# Patient Record
Sex: FEMALE | Race: WHITE | NOT HISPANIC OR LATINO | Employment: OTHER | RURAL
[De-identification: names, ages, dates, MRNs, and addresses within clinical notes are randomized per-mention and may not be internally consistent; named-entity substitution may affect disease eponyms.]

---

## 2018-09-04 ENCOUNTER — OFFICE VISIT (OUTPATIENT)
Dept: RETAIL CLINIC | Facility: CLINIC | Age: 43
End: 2018-09-04

## 2018-09-04 VITALS
DIASTOLIC BLOOD PRESSURE: 89 MMHG | WEIGHT: 185.6 LBS | TEMPERATURE: 97.5 F | OXYGEN SATURATION: 97 % | RESPIRATION RATE: 15 BRPM | SYSTOLIC BLOOD PRESSURE: 124 MMHG | HEIGHT: 66 IN | HEART RATE: 75 BPM | BODY MASS INDEX: 29.83 KG/M2

## 2018-09-04 DIAGNOSIS — J02.9 SORE THROAT: Primary | ICD-10-CM

## 2018-09-04 LAB
EXPIRATION DATE: NORMAL
INTERNAL CONTROL: NORMAL
Lab: NORMAL
S PYO AG THROAT QL: NEGATIVE

## 2018-09-04 PROCEDURE — 99203 OFFICE O/P NEW LOW 30 MIN: CPT | Performed by: NURSE PRACTITIONER

## 2018-09-04 PROCEDURE — 87880 STREP A ASSAY W/OPTIC: CPT | Performed by: NURSE PRACTITIONER

## 2018-09-04 RX ORDER — PREDNISONE 20 MG/1
20 TABLET ORAL DAILY
Qty: 3 TABLET | Refills: 0 | Status: SHIPPED | OUTPATIENT
Start: 2018-09-04 | End: 2018-09-07

## 2018-09-04 NOTE — PROGRESS NOTES
"Subjective   Kiara Ballard is a 42 y.o. female.   /89   Pulse 75   Temp 97.5 °F (36.4 °C) (Temporal Artery )   Resp 15   Ht 167.6 cm (66\")   Wt 84.2 kg (185 lb 9.6 oz)   SpO2 97%   BMI 29.96 kg/m²       Sore Throat    This is a new problem. Episode onset: 5 days. The problem has been waxing and waning. The pain is worse on the right side. There has been no fever. The pain is severe (but improving). Associated symptoms include congestion, ear pain (right sided), headaches, a plugged ear sensation and neck pain (radiating on right side). Pertinent negatives include no abdominal pain, coughing, diarrhea, drooling, ear discharge, shortness of breath, stridor, swollen glands, trouble swallowing or vomiting.        The following portions of the patient's history were reviewed and updated as appropriate: allergies, current medications, past family history, past medical history, past social history, past surgical history and problem list.    Review of Systems   HENT: Positive for congestion, ear pain (right sided) and sore throat. Negative for drooling, ear discharge and trouble swallowing.    Respiratory: Negative for cough, shortness of breath and stridor.    Gastrointestinal: Positive for nausea. Negative for abdominal pain, diarrhea and vomiting.   Musculoskeletal: Positive for neck pain (radiating on right side).   Neurological: Positive for headaches.       Objective   Physical Exam   Constitutional: She appears well-developed and well-nourished.   HENT:   Head: Normocephalic and atraumatic.   Right Ear: Ear canal normal. Tympanic membrane is bulging (mild). Tympanic membrane is not retracted.   Left Ear: Tympanic membrane and ear canal normal. Tympanic membrane is not retracted and not bulging.   Nose: Mucosal edema and rhinorrhea present. Right sinus exhibits no maxillary sinus tenderness and no frontal sinus tenderness. Left sinus exhibits no maxillary sinus tenderness and no frontal sinus " tenderness.   Mouth/Throat: Uvula is midline. Posterior oropharyngeal erythema (mild) present. Tonsils are 0 on the right. Tonsils are 0 on the left.   Cardiovascular: Regular rhythm and normal heart sounds.    Pulmonary/Chest: Effort normal. She has no wheezes. She has no rhonchi. She has no rales.   Lymphadenopathy:     She has no cervical adenopathy.   Skin: Skin is warm and dry.       Assessment/Plan   Kiara was seen today for earache and headache.    Diagnoses and all orders for this visit:    Sore throat  -     POC Rapid Strep A    Other orders  -     neomycin-polymyxin-hydrocortisone (CORTISPORIN) 3.5-79425-0 otic solution; Administer 3 drops to the right ear 4 (Four) Times a Day for 7 days.  -     predniSONE (DELTASONE) 20 MG tablet; Take 1 tablet by mouth Daily for 3 days.          Results for orders placed or performed in visit on 09/04/18   POC Rapid Strep A   Result Value Ref Range    Rapid Strep A Screen Negative Negative, VALID, INVALID, Not Performed    Internal Control Passed Passed    Lot Number [id number]     Expiration Date 12,744,415

## 2019-05-21 ENCOUNTER — OFFICE VISIT (OUTPATIENT)
Dept: RETAIL CLINIC | Facility: CLINIC | Age: 44
End: 2019-05-21

## 2019-05-21 VITALS
BODY MASS INDEX: 31.24 KG/M2 | HEART RATE: 85 BPM | RESPIRATION RATE: 12 BRPM | OXYGEN SATURATION: 99 % | HEIGHT: 66 IN | TEMPERATURE: 98.2 F | WEIGHT: 194.4 LBS

## 2019-05-21 DIAGNOSIS — L50.9 HIVES: Primary | ICD-10-CM

## 2019-05-21 PROCEDURE — 99213 OFFICE O/P EST LOW 20 MIN: CPT | Performed by: NURSE PRACTITIONER

## 2019-05-21 RX ORDER — PREDNISONE 10 MG/1
TABLET ORAL
Qty: 21 TABLET | Refills: 0 | Status: SHIPPED | OUTPATIENT
Start: 2019-05-21 | End: 2019-08-28

## 2019-05-21 RX ORDER — LEVOTHYROXINE SODIUM 0.05 MG/1
TABLET ORAL
COMMUNITY
Start: 2019-05-13

## 2019-05-21 RX ORDER — VENLAFAXINE HYDROCHLORIDE 75 MG/1
CAPSULE, EXTENDED RELEASE ORAL
COMMUNITY
Start: 2019-05-13

## 2019-05-21 RX ORDER — MEDROXYPROGESTERONE ACETATE 10 MG/1
TABLET ORAL
Refills: 3 | COMMUNITY
Start: 2019-04-08 | End: 2019-08-28

## 2019-05-21 NOTE — PATIENT INSTRUCTIONS
Hives  Hives (urticaria) are itchy, red, swollen areas on your skin. Hives can show up on any part of your body, and they can vary in size. They can be as small as the tip of a pen or much larger. Hives often fade within 24 hours (acute hives). In other cases, new hives show up after old ones fade. This can continue for many days or weeks (chronic hives).  Hives are caused by your body's reaction to an irritant or to something that you are allergic to (trigger). You can get hives right after being around a trigger or hours later. Hives do not spread from person to person (are not contagious). Hives may get worse if you scratch them, if you exercise, or if you have worries (emotional stress).  Follow these instructions at home:  Medicines  · Take or apply over-the-counter and prescription medicines only as told by your doctor.  · If you were prescribed an antibiotic medicine, use it as told by your doctor. Do not stop taking the antibiotic even if you start to feel better.  Skin Care  · Apply cool, wet cloths (cool compresses) to the itchy, red, swollen areas.  · Do not scratch your skin. Do not rub your skin.  General instructions  · Do not take hot showers or baths. This can make itching worse.  · Do not wear tight clothes.  · Use sunscreen and wear clothing that covers your skin when you are outside.  · Avoid any triggers that cause your hives. Keep a journal to help you keep track of what causes your hives. Write down:  ? What medicines you take.  ? What you eat and drink.  ? What products you use on your skin.  · Keep all follow-up visits as told by your doctor. This is important.  Contact a doctor if:  · Your symptoms are not better with medicine.  · Your joints are painful or swollen.  Get help right away if:  · You have a fever.  · You have belly pain.  · Your tongue or lips are swollen.  · Your eyelids are swollen.  · Your chest or throat feels tight.  · You have trouble breathing or swallowing.  These  symptoms may be an emergency. Do not wait to see if the symptoms will go away. Get medical help right away. Call your local emergency services (911 in the [place] Do not drive yourself to the hospital.  This information is not intended to replace advice given to you by your health care provider. Make sure you discuss any questions you have with your health care provider.  Document Released: 09/26/2009 Document Revised: 05/25/2017 Document Reviewed: 10/05/2016  Elsevier Interactive Patient Education © 2019 [employer]

## 2019-05-21 NOTE — PROGRESS NOTES
"Subjective   Kiara Ballard is a 43 y.o. female.   Pulse 85   Temp 98.2 °F (36.8 °C)   Resp 12   Ht 167.6 cm (66\")   Wt 88.2 kg (194 lb 6.4 oz)   LMP 05/13/2019   SpO2 99%   BMI 31.38 kg/m²   Past Medical History:   Diagnosis Date   • Acid reflux    • Allergic    • Anxiety    • Chronic pain disorder    • Depression    • Disease of thyroid gland    • Gallbladder abscess    • Hypertension    • Migraines      Allergies   Allergen Reactions   • Benadryl [Diphenhydramine Hcl (Sleep)] Anaphylaxis         Rash   This is a recurrent problem. The current episode started 1 to 4 weeks ago. The problem has been rapidly worsening since onset. The affected locations include the left lower leg, left upper leg, right upper leg, right lower leg, left hand and torso. The rash is characterized by swelling and itchiness (pops up in one area, goes back down pops up in another, Cannot think of any triggers). Pertinent negatives include no anorexia, congestion, cough, diarrhea, eye pain, facial edema, fatigue, fever, joint pain, nail changes, rhinorrhea, shortness of breath, sore throat or vomiting. Her past medical history is significant for eczema (as a child).        The following portions of the patient's history were reviewed and updated as appropriate: allergies, current medications, past family history, past medical history, past social history, past surgical history and problem list.    Review of Systems   Constitutional: Negative for fatigue and fever.   HENT: Negative for congestion, rhinorrhea and sore throat.    Eyes: Negative for pain.   Respiratory: Negative for cough and shortness of breath.    Gastrointestinal: Negative for anorexia, diarrhea and vomiting.   Musculoskeletal: Negative for joint pain.   Skin: Positive for rash. Negative for nail changes.       Objective   Physical Exam   Constitutional: She appears well-developed and well-nourished.  Non-toxic appearance. She does not appear ill.   HENT:   Head: " Normocephalic and atraumatic.   Cardiovascular: Regular rhythm and normal heart sounds.   Pulmonary/Chest: Effort normal. She has no wheezes. She has no rhonchi. She has no rales.   Lymphadenopathy:     She has no cervical adenopathy.   Skin: Skin is warm and dry.        Bilateral legs and arms show whelps with red base. They cover majority of legs and a few minor one on bilateral hands.        Assessment/Plan   Kiara was seen today for rash.    Diagnoses and all orders for this visit:    Hives    Other orders  -     predniSONE (DELTASONE) 10 MG tablet; 6/5/4/3/2/1 As directed PO

## 2019-08-28 ENCOUNTER — OFFICE VISIT (OUTPATIENT)
Dept: RETAIL CLINIC | Facility: CLINIC | Age: 44
End: 2019-08-28

## 2019-08-28 VITALS
DIASTOLIC BLOOD PRESSURE: 84 MMHG | RESPIRATION RATE: 12 BRPM | HEART RATE: 86 BPM | BODY MASS INDEX: 30.28 KG/M2 | TEMPERATURE: 99.3 F | WEIGHT: 188.4 LBS | SYSTOLIC BLOOD PRESSURE: 122 MMHG | OXYGEN SATURATION: 99 % | HEIGHT: 66 IN

## 2019-08-28 DIAGNOSIS — L50.9 HIVE: Primary | ICD-10-CM

## 2019-08-28 PROCEDURE — 99213 OFFICE O/P EST LOW 20 MIN: CPT | Performed by: NURSE PRACTITIONER

## 2019-08-28 RX ORDER — PRAZOSIN HYDROCHLORIDE 1 MG/1
4 CAPSULE ORAL
Refills: 1 | COMMUNITY
Start: 2019-07-16

## 2019-08-28 RX ORDER — ALBUTEROL SULFATE 90 UG/1
AEROSOL, METERED RESPIRATORY (INHALATION)
Refills: 1 | COMMUNITY
Start: 2019-08-12

## 2019-08-28 RX ORDER — PREDNISONE 10 MG/1
TABLET ORAL
Qty: 21 TABLET | Refills: 0 | Status: SHIPPED | OUTPATIENT
Start: 2019-08-28

## 2019-08-28 RX ORDER — HYDROXYZINE HYDROCHLORIDE 10 MG/1
10 TABLET, FILM COATED ORAL 2 TIMES DAILY
Refills: 1 | COMMUNITY
Start: 2019-06-18

## 2019-08-28 RX ORDER — MULTIVITAMIN/IRON/FOLIC ACID 18MG-0.4MG
1 TABLET ORAL DAILY
Refills: 3 | COMMUNITY
Start: 2019-08-01 | End: 2023-03-21 | Stop reason: ALTCHOICE

## 2019-08-28 NOTE — PATIENT INSTRUCTIONS
Hives    Hives (urticaria) are itchy, red, swollen areas on your skin. Hives can appear on any part of your body and can vary in size. They can be as small as the tip of a pen or much larger. Hives often fade within 24 hours (acute hives). In other cases, new hives appear after old ones fade. This cycle can continue for several days or weeks (chronic hives).  Hives result from your body's reaction to an irritant or to something that you are allergic to (trigger). When you are exposed to a trigger, your body releases a chemical (histamine) that causes redness, itching, and swelling. You can get hives immediately after being exposed to a trigger or hours later.  Hives do not spread from person to person (are not contagious). Your hives may get worse with scratching, exercise, and emotional stress.  What are the causes?  Causes of this condition include:  · Allergies to certain foods or ingredients.  · Insect bites or stings.  · Exposure to pollen or pet dander.  · Contact with latex or chemicals.  · Spending time in sunlight, heat, or cold (exposure).  · Exercise.  · Stress.  You can also get hives from some medical conditions and treatments. These include:  · Viruses, including the common cold.  · Bacterial infections, such as urinary tract infections and strep throat.  · Disorders such as vasculitis, lupus, or thyroid disease.  · Certain medications.  · Allergy shots.  · Blood transfusions.  Sometimes, the cause of hives is not known (idiopathic hives).  What increases the risk?  This condition is more likely to develop in:  · Women.  · People who have food allergies, especially to citrus fruits, milk, eggs, peanuts, tree nuts, or shellfish.  · People who are allergic to:  ? Medicines.  ? Latex.  ? Insects.  ? Animals.  ? Pollen.  · People who have certain medical conditions, including lupus or thyroid disease.  What are the signs or symptoms?  The main symptom of this condition is raised, itchy red or white bumps  or patches on your skin. These areas may:  · Become large and swollen (welts).  · Change in shape and location, quickly and repeatedly.  · Be separate hives or connect over a large area of skin.  · Sting or become painful.  · Turn white when pressed in the center (erasmo).  In severe cases, your hands, feet, and face may also become swollen. This may occur if hives develop deeper in your skin.  How is this diagnosed?  This condition is diagnosed based on your symptoms, medical history, and physical exam. Your skin, urine, or blood may be tested to find out what is causing your hives and to rule out other health issues. Your health care provider may also remove a small sample of skin from the affected area and examine it under a microscope (biopsy).  How is this treated?  Treatment depends on the severity of your condition. Your health care provider may recommend using cool, wet cloths (cool compresses) or taking cool showers to relieve itching. Hives are sometimes treated with medicines, including:  · Antihistamines.  · Corticosteroids.  · Antibiotics.  · An injectable medicine (omalizumab). Your health care provider may prescribe this if you have chronic idiopathic hives and you continue to have symptoms even after treatment with antihistamines.  Severe cases may require an emergency injection of adrenaline (epinephrine) to prevent a life-threatening allergic reaction (anaphylaxis).  Follow these instructions at home:  Medicines  · Take or apply over-the-counter and prescription medicines only as told by your health care provider.  · If you were prescribed an antibiotic medicine, use it as told by your health care provider. Do not stop taking the antibiotic even if you start to feel better.  Skin Care  · Apply cool compresses to the affected areas.  · Do not scratch or rub your skin.  General instructions  · Do not take hot showers or baths. This can make itching worse.  · Do not wear tight-fitting clothing.  · Use  sunscreen and wear protective clothing when you are outside.  · Avoid any substances that cause your hives. Keep a journal to help you track what causes your hives. Write down:  ? What medicines you take.  ? What you eat and drink.  ? What products you use on your skin.  · Keep all follow-up visits as told by your health care provider. This is important.  Contact a health care provider if:  · Your symptoms are not controlled with medicine.  · Your joints are painful or swollen.  Get help right away if:  · You have a fever.  · You have pain in your abdomen.  · Your tongue or lips are swollen.  · Your eyelids are swollen.  · Your chest or throat feels tight.  · You have trouble breathing or swallowing.  These symptoms may represent a serious problem that is an emergency. Do not wait to see if the symptoms will go away. Get medical help right away. Call your local emergency services (911 in the [place] Do not drive yourself to the hospital.  This information is not intended to replace advice given to you by your health care provider. Make sure you discuss any questions you have with your health care provider.  Document Released: 12/18/2006 Document Revised: 05/17/2017 Document Reviewed: 10/05/2016  Elsevier Interactive Patient Education © 2019 [employer]

## 2019-08-28 NOTE — PROGRESS NOTES
"Subjective   Kiara Ballard is a 43 y.o. female.   /84   Pulse 86   Temp 99.3 °F (37.4 °C)   Resp 12   Ht 167.6 cm (66\")   Wt 85.5 kg (188 lb 6.4 oz)   LMP 08/05/2019   SpO2 99%   BMI 30.41 kg/m²   Past Medical History:   Diagnosis Date   • Acid reflux    • Allergic    • Anxiety    • Chronic pain disorder    • Depression    • Disease of thyroid gland    • Gallbladder abscess    • Hypertension    • Migraines      Allergies   Allergen Reactions   • Benadryl [Diphenhydramine Hcl (Sleep)] Anaphylaxis     But does ok with loratadine          Rash   This is a new problem. The current episode started in the past 7 days. The problem has been gradually worsening since onset. Pertinent negatives include no anorexia, congestion, cough, diarrhea, eye pain, facial edema, fatigue, fever, joint pain, nail changes, rhinorrhea, shortness of breath, sore throat or vomiting.        The following portions of the patient's history were reviewed and updated as appropriate: allergies, current medications, past family history, past medical history, past social history, past surgical history and problem list.    Review of Systems   Constitutional: Negative for fatigue and fever.   HENT: Negative for congestion, rhinorrhea and sore throat.    Eyes: Negative for pain.   Respiratory: Negative for cough and shortness of breath.    Gastrointestinal: Negative for anorexia, diarrhea and vomiting.   Musculoskeletal: Negative for joint pain.   Skin: Positive for rash. Negative for nail changes.       Objective   Physical Exam   Constitutional: She appears well-developed and well-nourished.  Non-toxic appearance. She does not appear ill.   HENT:   Head: Normocephalic and atraumatic.   Cardiovascular: Regular rhythm and normal heart sounds.   Pulmonary/Chest: Effort normal. She has no wheezes. She has no rhonchi. She has no rales.   Lymphadenopathy:     She has cervical adenopathy.        Right cervical: Superficial cervical (single " enlarge node, clear boarders movable, slight tenderness 2cm) adenopathy present.   Skin: Skin is warm and dry.   One area of whelp on left neck area.        Assessment/Plan   Kiara was seen today for rash.    Diagnoses and all orders for this visit:    Hive    Other orders  -     predniSONE (DELTASONE) 10 MG tablet; 6/5/4/3/2/1 As directed PO      PT advised for future episodes to follow up with PCP. Pt requested work note. Advised this is something that she can work with today. Note provided with time she left.

## 2021-09-14 ENCOUNTER — HOSPITAL ENCOUNTER (OUTPATIENT)
Age: 46
End: 2021-09-14
Payer: MEDICAID

## 2021-09-14 DIAGNOSIS — M79.675: ICD-10-CM

## 2021-09-14 DIAGNOSIS — M79.674: ICD-10-CM

## 2021-09-14 DIAGNOSIS — M79.89: ICD-10-CM

## 2021-09-14 DIAGNOSIS — L60.0: Primary | ICD-10-CM

## 2021-09-14 DIAGNOSIS — L60.8: ICD-10-CM

## 2021-09-14 PROCEDURE — 87077 CULTURE AEROBIC IDENTIFY: CPT

## 2021-09-14 PROCEDURE — 87205 SMEAR GRAM STAIN: CPT

## 2021-09-14 PROCEDURE — 87070 CULTURE OTHR SPECIMN AEROBIC: CPT

## 2021-09-14 PROCEDURE — 87186 SC STD MICRODIL/AGAR DIL: CPT

## 2023-03-21 ENCOUNTER — OFFICE VISIT (OUTPATIENT)
Dept: CARDIOLOGY | Facility: CLINIC | Age: 48
End: 2023-03-21
Payer: MEDICARE

## 2023-03-21 VITALS
OXYGEN SATURATION: 98 % | HEART RATE: 111 BPM | DIASTOLIC BLOOD PRESSURE: 80 MMHG | WEIGHT: 223 LBS | HEIGHT: 66 IN | SYSTOLIC BLOOD PRESSURE: 120 MMHG | BODY MASS INDEX: 35.84 KG/M2

## 2023-03-21 DIAGNOSIS — R06.02 SOB (SHORTNESS OF BREATH) ON EXERTION: ICD-10-CM

## 2023-03-21 DIAGNOSIS — K21.9 GASTROESOPHAGEAL REFLUX DISEASE WITHOUT ESOPHAGITIS: ICD-10-CM

## 2023-03-21 DIAGNOSIS — G47.411 PRIMARY NARCOLEPSY WITH CATAPLEXY: ICD-10-CM

## 2023-03-21 DIAGNOSIS — R00.0 TACHYCARDIA: ICD-10-CM

## 2023-03-21 DIAGNOSIS — M79.89 LEG SWELLING: ICD-10-CM

## 2023-03-21 DIAGNOSIS — R07.2 PRECORDIAL PAIN: ICD-10-CM

## 2023-03-21 DIAGNOSIS — G47.33 OSA (OBSTRUCTIVE SLEEP APNEA): Primary | ICD-10-CM

## 2023-03-21 PROBLEM — G47.419 NARCOLEPSY: Status: ACTIVE | Noted: 2023-03-21

## 2023-03-21 PROCEDURE — 93000 ELECTROCARDIOGRAM COMPLETE: CPT | Performed by: NURSE PRACTITIONER

## 2023-03-21 PROCEDURE — 1160F RVW MEDS BY RX/DR IN RCRD: CPT | Performed by: NURSE PRACTITIONER

## 2023-03-21 PROCEDURE — 1159F MED LIST DOCD IN RCRD: CPT | Performed by: NURSE PRACTITIONER

## 2023-03-21 PROCEDURE — 99214 OFFICE O/P EST MOD 30 MIN: CPT | Performed by: NURSE PRACTITIONER

## 2023-03-21 RX ORDER — FOLIC ACID 1 MG/1
1 TABLET ORAL DAILY
COMMUNITY
Start: 2023-03-10

## 2023-03-21 RX ORDER — LEVOTHYROXINE SODIUM 0.07 MG/1
1 TABLET ORAL DAILY
COMMUNITY
Start: 2023-03-10

## 2023-03-21 RX ORDER — TOPIRAMATE 100 MG/1
100 TABLET, FILM COATED ORAL
COMMUNITY
Start: 2023-02-25

## 2023-03-21 RX ORDER — HYDROCHLOROTHIAZIDE 50 MG/1
50 TABLET ORAL DAILY
COMMUNITY
End: 2023-03-21

## 2023-03-21 RX ORDER — BREXPIPRAZOLE 4 MG/1
TABLET ORAL
COMMUNITY
Start: 2023-01-13

## 2023-03-21 RX ORDER — IBUPROFEN 800 MG/1
1 TABLET ORAL 3 TIMES DAILY
COMMUNITY
Start: 2023-02-26

## 2023-03-21 RX ORDER — HYDROCHLOROTHIAZIDE 25 MG/1
25 TABLET ORAL DAILY
Qty: 90 TABLET | Refills: 1 | Status: SHIPPED | OUTPATIENT
Start: 2023-03-21

## 2023-03-21 RX ORDER — RAMELTEON 8 MG/1
8 TABLET ORAL
COMMUNITY
Start: 2022-12-16

## 2023-03-21 RX ORDER — MIRTAZAPINE 15 MG/1
TABLET, FILM COATED ORAL
COMMUNITY
Start: 2023-03-07

## 2023-03-21 RX ORDER — FUROSEMIDE 20 MG/1
20 TABLET ORAL DAILY PRN
Qty: 90 TABLET | Refills: 3 | Status: SHIPPED | OUTPATIENT
Start: 2023-03-21

## 2023-03-21 RX ORDER — CARIPRAZINE 1.5 MG/1
CAPSULE, GELATIN COATED ORAL
COMMUNITY
Start: 2023-03-07

## 2023-03-21 RX ORDER — OMEPRAZOLE 20 MG/1
1 CAPSULE, DELAYED RELEASE ORAL DAILY
COMMUNITY
Start: 2023-01-12 | End: 2023-03-21 | Stop reason: SDUPTHER

## 2023-03-21 RX ORDER — OMEPRAZOLE 20 MG/1
20 CAPSULE, DELAYED RELEASE ORAL DAILY
Qty: 30 CAPSULE | Refills: 2 | Status: SHIPPED | OUTPATIENT
Start: 2023-03-21

## 2023-03-21 RX ORDER — PITOLISANT HYDROCHLORIDE 17.8 MG/1
TABLET, FILM COATED ORAL
COMMUNITY
Start: 2023-02-23

## 2023-03-21 NOTE — PROGRESS NOTES
Follow-Up Sleep Consult     Date:   2023  Name: Kiara Eason  :   1975  PCP: Virgilio Harvey MD    Chief Complaint   Patient presents with   • Sleep Apnea     31 to 90 days compliance        Subjective     History of Present Illness  Kiara Eason is a 47 y.o. female who presents today for follow-up on SOTERO.  Patient is only using BiPAP approximately 2 hours every night due to insomnia.  She continues to have excessive daytime sleepiness.  She has a history of narcolepsy that was diagnosed in .  She is currently on Wakix, prescribed by neurologist Dr Thomas.  Patient reports that her neurologist is leaving she will need someone to prescribe medication for narcolepsy.  She feels like the Wakix not strong enough.  Patient also reports worsening shortness of breath and lower extremity edema.  She gets extremely short of breath with just normal daily activities.  She also experiences chest tightness and tachycardia.  Symptoms have worsened in the last month.  She doubled her hydrochlorothiazide dose on her own, and is currently taking 50 mg once daily.     History of SOTERO with a baseline AHI of 6 on 2017.  Last titration 10/21/2020.  Current Treatment: BiPAP, settings 17/11 cm.  Download reviewed with patient: AHI on download is 4.5.  Compliance on download is 63%.  When used more than 4 hours 11%.  Average use per night is 1 hour and 49 minutes.  Current mask used is nasal pillow.      The patient's relevant past medical, surgical, family, and social history reviewed and updated in Epic as appropriate.    Past Medical History:   Diagnosis Date   • Acid reflux    • Allergic    • Anxiety    • Chronic pain disorder    • Depression    • Disease of thyroid gland    • Gallbladder abscess    • Hypertension    • Migraines      Past Surgical History:   Procedure Laterality Date   • CARPAL TUNNEL RELEASE Bilateral    • CERVICAL CONIZATION     • CHOLECYSTECTOMY     • GALLBLADDER SURGERY       GALLSTONES   • THYROID SURGERY      RADIATION    • TONSILLECTOMY     • TUBAL ABDOMINAL LIGATION       OB History        2    Para   2    Term   2       0    AB   0    Living   2       SAB   0    IAB   0    Ectopic   0    Molar        Multiple   0    Live Births                  Allergies   Allergen Reactions   • Benadryl [Diphenhydramine Hcl (Sleep)] Anaphylaxis     But does ok with loratadine      Prior to Admission medications    Medication Sig Start Date End Date Taking? Authorizing Provider   albuterol sulfate  (90 Base) MCG/ACT inhaler INHALE 2 PUFFS BY MOUTH EVERY 6-8 HOURS  Patient not taking: Reported on 3/21/2023 8/12/19   Margret Dick MD   [facility] OMEPRAZOLE 20 MG tablet delayed-release Take 1 tablet by mouth Daily. 19   Margret Dick MD   folic acid (FOLVITE) 1 MG tablet Take 1 tablet by mouth Daily. 3/10/23   Margret Dick MD   hydrOXYzine (ATARAX) 10 MG tablet Take 10 mg by mouth 2 (Two) Times a Day. 19   Margret Dick MD   ibuprofen (ADVIL,MOTRIN) 800 MG tablet Take 1 tablet by mouth 3 (Three) Times a Day. 23   Margret Dick MD   levothyroxine (SYNTHROID, LEVOTHROID) 50 MCG tablet  19   Margret Dick MD   levothyroxine (SYNTHROID, LEVOTHROID) 75 MCG tablet Take 1 tablet by mouth Daily. 3/10/23   Margret Dick MD   mirtazapine (REMERON) 15 MG tablet  3/7/23   Marrget Dick MD   omeprazole (priLOSEC) 20 MG capsule Take 1 capsule by mouth Daily. 23   Margret Dick MD   prazosin (MINIPRESS) 1 MG capsule Take 1 mg by mouth every night at bedtime. 19   Margret Dick MD   predniSONE (DELTASONE) 10 MG tablet //3/ As directed PO 19   Berkley Alvarez APRN   ramelteon (ROZEREM) 8 MG tablet Take 1 tablet by mouth every night at bedtime. 22   Margret Dick MD   Rexulti 4 MG tablet Take 1 oral tablet once a day 23   Margret Dick MD   topiramate  "(TOPAMAX) 100 MG tablet Take 1 tablet by mouth every night at bedtime. 2/25/23   Margret Dick MD   venlafaxine XR (EFFEXOR-XR) 150 MG 24 hr capsule  9/1/16   Margret Dick MD   venlafaxine XR (EFFEXOR-XR) 75 MG 24 hr capsule  5/13/19   Margret Dick MD   Vraylar 1.5 MG capsule capsule  3/7/23   Margret Dick MD   Wakix 17.8 MG tablet  2/23/23   Margret Dick MD     Family History   Problem Relation Age of Onset   • Breast cancer Cousin         MATERNAL 40'S-50'S   • Ovarian cancer Other         MAT GREAT AUNT 50'S UTERINE/CERVICAL/OVARIAN    • Osteoporosis Maternal Grandmother        Objective     Vital Signs:  /80   Pulse 111   Ht 167.6 cm (66\")   Wt 101 kg (223 lb)   SpO2 98%   BMI 35.99 kg/m²     Class 2 Severe Obesity (BMI >=35 and <=39.9). Obesity-related health conditions include the following: obstructive sleep apnea, hypertension and GERD. Obesity is worsening. BMI is is above average; BMI management plan is completed. We discussed portion control and increasing exercise.        Physical Exam  Vitals reviewed.   Constitutional:       Appearance: Normal appearance.   HENT:      Head: Normocephalic and atraumatic.   Eyes:      Pupils: Pupils are equal, round, and reactive to light.   Cardiovascular:      Rate and Rhythm: Regular rhythm. Tachycardia present.      Pulses: Normal pulses.      Heart sounds: Normal heart sounds.   Pulmonary:      Effort: Pulmonary effort is normal.      Breath sounds: Normal breath sounds.   Musculoskeletal:      Right lower leg: Edema (2+) present.      Left lower leg: Edema (2+) present.   Skin:     General: Skin is warm and dry.   Neurological:      General: No focal deficit present.      Mental Status: She is alert and oriented to person, place, and time.   Psychiatric:         Mood and Affect: Mood normal.         Behavior: Behavior normal.             PAP download reviewed: From 12/28/2022 to 3/20/2023      ECG 12 " Lead    Date/Time: 3/21/2023 5:37 PM  Performed by: Sarah Camacho APRN  Authorized by: Sarah Camacho APRN   Comparison: not compared with previous ECG   Previous ECG: no previous ECG available  Rhythm: sinus tachycardia  Rate: tachycardic    Clinical impression: abnormal EKG  Comments: Minimal ST depression.             Assessment and Plan     Diagnoses and all orders for this visit:    1. SOTERO (obstructive sleep apnea) (Primary)  Assessment & Plan:  AHI on download 4.5.  Patient is only using BiPAP approximately 2 hours every night due to insomnia.  No problems with mask or machine.  Encouraged increase compliance of BiPAP therapy.    Orders:  -     PAP Therapy  -     Ambulatory Referral to Sleep Medicine    2. Precordial pain  Assessment & Plan:  Occasional chest tightness along with the shortness of breath.  I discussed stress test with the patient and she declined at this time.  States that she has tried doing treadmill stress test before and unable to walk on the treadmill due to shortness of breath.  I explained to her that we could do a nuclear stress test instead but she does not want to have that done at this time.  We will start with echocardiogram but still may need to do the stress test.  She verbalized understanding and still is declining a stress test at this time. She denies active chest pain today. Instructed her to report to ER if symptoms worsen.     Orders:  -     Adult Transthoracic Echo Complete W/ Cont if Necessary Per Protocol; Future  -     ECG 12 Lead    3. SOB (shortness of breath) on exertion  Assessment & Plan:  Worsening shortness of breath with exertion  -Echoardiogram    Orders:  -     Adult Transthoracic Echo Complete W/ Cont if Necessary Per Protocol; Future    4. Gastroesophageal reflux disease without esophagitis  Assessment & Plan:  Refill Omeprazole 1 mg once daily.    Orders:  -     omeprazole (priLOSEC) 20 MG capsule; Take 1 capsule by mouth Daily.  Dispense: 30  capsule; Refill: 2  -     Basic Metabolic Panel; Future    5. Tachycardia  -     Holter Monitor - 72 Hour Up To 15 Days    6. Leg swelling  Assessment & Plan:  Bilateral 2+ pitting edema noted on exam.  She recently doubled her hydrochlorothiazide dose on her own to help with the swelling.  She is currently taking 50 mg once daily.  We will decrease HCTZ back down to 25 mg once daily and add furosemide 20 mg daily as needed.  Follow-up in 1 month with a BMP prior to visit.     Labs from 11/16/23 reviewed- Cr 0.82, GFR 89    Orders:  -     Basic Metabolic Panel; Future    7. Primary narcolepsy with cataplexy  Assessment & Plan:  Diagnosed in 2008.  Has used stimulants in the past and is currently on Wakix prescribed by neurologist. She feels like this is not strong enough for her but previous stimulant was too strong.  Her neurologist is leaving and she will need someone to prescribe the medication.  We will refer to  Sleep Disorder clinic for further management.      Other orders  -     hydroCHLOROthiazide (HYDRODIURIL) 25 MG tablet; Take 1 tablet by mouth Daily.  Dispense: 90 tablet; Refill: 1  -     furosemide (LASIX) 20 MG tablet; Take 1 tablet by mouth Daily As Needed (swelling).  Dispense: 90 tablet; Refill: 3      ER if symptoms increase, Sleep hygiene discussed, Sleep risks reviewed (driving, medical, sleep death, sedating agents), Limit salt and Elevate legs         Follow Up  Return in about 4 weeks (around 4/18/2023) for Follow up with Echo .  Patient was given instructions and counseling regarding her condition or for health maintenance advice. Please see specific information pulled into the AVS if appropriate.

## 2023-03-21 NOTE — ASSESSMENT & PLAN NOTE
AHI on download 4.5.  Patient is only using BiPAP approximately 2 hours every night due to insomnia.  No problems with mask or machine.  Encouraged increase compliance of BiPAP therapy.

## 2023-03-21 NOTE — ASSESSMENT & PLAN NOTE
Occasional chest tightness along with the shortness of breath.  I discussed stress test with the patient and she declined at this time.  States that she has tried doing treadmill stress test before and unable to walk on the treadmill due to shortness of breath.  I explained to her that we could do a nuclear stress test instead but she does not want to have that done at this time.  We will start with echocardiogram but still may need to do the stress test.  She verbalized understanding and still is declining a stress test at this time. She denies active chest pain today. Instructed her to report to ER if symptoms worsen.

## 2023-03-21 NOTE — ASSESSMENT & PLAN NOTE
Bilateral 2+ pitting edema noted on exam.  She recently doubled her hydrochlorothiazide dose on her own to help with the swelling.  She is currently taking 50 mg once daily.  We will decrease HCTZ back down to 25 mg once daily and add furosemide 20 mg daily as needed.  Follow-up in 1 month with a BMP prior to visit.     Labs from 11/16/23 reviewed- Cr 0.82, GFR 89

## 2023-03-21 NOTE — ASSESSMENT & PLAN NOTE
Diagnosed in 2008.  Has used stimulants in the past and is currently on Wakix prescribed by neurologist. She feels like this is not strong enough for her but previous stimulant was too strong.  Her neurologist is leaving and she will need someone to prescribe the medication.  We will refer to [facility] for further management.

## 2023-04-25 DIAGNOSIS — M79.89 LEG SWELLING: ICD-10-CM

## 2023-04-25 DIAGNOSIS — K21.9 GASTROESOPHAGEAL REFLUX DISEASE WITHOUT ESOPHAGITIS: ICD-10-CM

## 2023-05-01 ENCOUNTER — TELEPHONE (OUTPATIENT)
Dept: CARDIOLOGY | Facility: CLINIC | Age: 48
End: 2023-05-01
Payer: MEDICARE

## 2023-05-01 NOTE — TELEPHONE ENCOUNTER
Called patient regarding lab results.K+ 3.2, advised to increase foods high in potassium (bannanas, spinach, broccoli, potatoes). Patient verbalized understanding. She has a follow up appointment on 5/24/23)

## 2023-05-24 ENCOUNTER — OFFICE VISIT (OUTPATIENT)
Dept: CARDIOLOGY | Facility: CLINIC | Age: 48
End: 2023-05-24
Payer: MEDICARE

## 2023-05-24 VITALS
DIASTOLIC BLOOD PRESSURE: 68 MMHG | HEART RATE: 82 BPM | SYSTOLIC BLOOD PRESSURE: 128 MMHG | HEIGHT: 66 IN | OXYGEN SATURATION: 98 % | WEIGHT: 228 LBS | BODY MASS INDEX: 36.64 KG/M2

## 2023-05-24 DIAGNOSIS — R06.02 SOB (SHORTNESS OF BREATH) ON EXERTION: ICD-10-CM

## 2023-05-24 DIAGNOSIS — Z72.0 TOBACCO USE: ICD-10-CM

## 2023-05-24 DIAGNOSIS — R60.0 BILATERAL LEG EDEMA: ICD-10-CM

## 2023-05-24 DIAGNOSIS — E87.6 HYPOKALEMIA: ICD-10-CM

## 2023-05-24 RX ORDER — POTASSIUM CHLORIDE 20 MEQ/1
20 TABLET, EXTENDED RELEASE ORAL DAILY
Qty: 90 TABLET | Refills: 0 | Status: SHIPPED | OUTPATIENT
Start: 2023-05-24

## 2023-05-24 RX ORDER — SODIUM FLUORIDE 6 MG/ML
PASTE, DENTIFRICE DENTAL
COMMUNITY
Start: 2023-04-25

## 2023-05-24 RX ORDER — HYDROCODONE BITARTRATE AND ACETAMINOPHEN 7.5; 325 MG/1; MG/1
1 TABLET ORAL AS NEEDED
COMMUNITY
Start: 2023-05-08

## 2023-05-24 RX ORDER — FLUTICASONE PROPIONATE 50 MCG
BLISTER, WITH INHALATION DEVICE INHALATION AS NEEDED
COMMUNITY
Start: 2023-04-03

## 2023-05-24 RX ORDER — PRAZOSIN HYDROCHLORIDE 5 MG/1
1 CAPSULE ORAL DAILY
COMMUNITY
Start: 2023-05-04

## 2023-05-24 RX ORDER — ARIPIPRAZOLE 5 MG/1
1 TABLET ORAL DAILY
COMMUNITY
Start: 2023-05-11

## 2023-05-24 RX ORDER — FUROSEMIDE 20 MG/1
20 TABLET ORAL DAILY PRN
Qty: 90 TABLET | Refills: 0 | Status: SHIPPED | OUTPATIENT
Start: 2023-05-24

## 2023-05-24 RX ORDER — SALICYLIC ACID 2 %
1 CLEANSER (ML) TOPICAL EVERY 12 HOURS SCHEDULED
COMMUNITY
Start: 2023-03-30

## 2023-05-24 NOTE — PROGRESS NOTES
[facility] Provider Note     Date:   2023   Name: Kiara Eason  :   1975  PCP: Virgilio Harvey MD    Chief Complaint   Patient presents with   • Shortness of Breath     Pt here to follow up shortness of breath with Echo and BMP results.       Subjective     History of Present Illness  Kiara Eason is a 47 y.o. female who presents today for follow-up on echo, edema, and shortness of air.    She has coexisting sleep apnea that she uses a BiPAP for.  As well as insomnia and narcolepsy.  Neurologist was prescribing her Wakix but the neurologist has left so she has been referred to sleep medicine.    Last visit she was also complaining of chest pain, tachycardia, and SOB so the nurse practitioner ordered a echo and labs.  Bilat edema still present on HCTZ 25 mg daily and furosemide 20 mg daily.    2023 echo reviewed.  EF normal.  Mild pulmonary hypertension present.  We discussed that treating SOTERO will likely benefit pulmonary hypertension and her edema.  Patient has not been using her machine more than 2 hours a night.  She reports this is related to insomnia.  She has an upcoming appointment with  sleep to discuss her insomnia, narcolepsy, and SOTERO.    Due to her potassium level still being low we will add potassium in addition to her Lasix and recheck in a few weeks.    Given her continued shortness of air, and continued daily smoking, and pulmonary hypertension I have suggested that she see pulmonary.  She is open to this.  I will put in a referral.    She has known thyroid issues that she takes medication for.  She is requesting that her TSH level be checked as she does not go back to her PCP for few months and she is concerned that her edema may be related.    We will see her back in 3 months    SOTERO  Edema  Shortness of air  Mild pulmonary hypertension  Narcolepsy/insomnia- sleep      3/21/23 Echo  3/21/23 Holter    Allergies   Allergen Reactions   •  Benadryl [Diphenhydramine Hcl (Sleep)] Anaphylaxis     But does ok with loratadine        Current Outpatient Medications:   •  ARIPiprazole (ABILIFY) 5 MG tablet, 1 tablet Daily., Disp: , Rfl:   •  [facility] Allergy Relief D  MG per 12 hr tablet, Take 1 tablet by mouth Every 12 (Twelve) Hours., Disp: , Rfl:   •  Flovent Diskus 50 MCG/ACT diskus inhaler, As Needed., Disp: , Rfl:   •  folic acid (FOLVITE) 1 MG tablet, Take 1 tablet by mouth Daily., Disp: , Rfl:   •  furosemide (LASIX) 20 MG tablet, Take 1 tablet by mouth Daily As Needed (swelling)., Disp: 90 tablet, Rfl: 0  •  hydroCHLOROthiazide (HYDRODIURIL) 25 MG tablet, Take 1 tablet by mouth Daily., Disp: 90 tablet, Rfl: 1  •  HYDROcodone-acetaminophen (NORCO) 7.5-325 MG per tablet, 1 tablet As Needed., Disp: , Rfl:   •  ibuprofen (ADVIL,MOTRIN) 800 MG tablet, Take 1 tablet by mouth 3 (Three) Times a Day., Disp: , Rfl:   •  levothyroxine (SYNTHROID, LEVOTHROID) 50 MCG tablet, , Disp: , Rfl:   •  mirtazapine (REMERON) 15 MG tablet, , Disp: , Rfl:   •  omeprazole (priLOSEC) 20 MG capsule, Take 1 capsule by mouth Daily., Disp: 30 capsule, Rfl: 2  •  prazosin (MINIPRESS) 5 MG capsule, 1 capsule Daily., Disp: , Rfl:   •  Sodium Fluoride 5000 PPM 1.1 % paste, BRUSH TWICE DAILY AFTER NORMAL BRUSHING. DO NOT EAT, DRINK OR SMOKE 30 MINUTES AFTER APPLICATION, Disp: , Rfl:   •  topiramate (TOPAMAX) 100 MG tablet, Take 1 tablet by mouth every night at bedtime., Disp: , Rfl:   •  venlafaxine XR (EFFEXOR-XR) 150 MG 24 hr capsule, , Disp: , Rfl:   •  venlafaxine XR (EFFEXOR-XR) 75 MG 24 hr capsule, , Disp: , Rfl:   •  Wakix 17.8 MG tablet, , Disp: , Rfl:   •  potassium chloride (K-DUR,KLOR-CON) 20 MEQ CR tablet, Take 1 tablet by mouth Daily. Take with Lasix, Disp: 90 tablet, Rfl: 0    Past Medical History:   Diagnosis Date   • Acid reflux    • Allergic    • Anxiety    • Chronic pain disorder    • Depression    • Disease of thyroid gland    • Gallbladder abscess    •  "Hypertension    • Migraines       Past Surgical History:   Procedure Laterality Date   • CARPAL TUNNEL RELEASE Bilateral    • CERVICAL CONIZATION     • CHOLECYSTECTOMY     • THYROID SURGERY      RADIATION    • TONSILLECTOMY     • TUBAL ABDOMINAL LIGATION       Family History   Problem Relation Age of Onset   • Myelodysplastic syndrome Mother    • Pneumonia Father    • Alcohol abuse Father    • Osteoporosis Maternal Grandmother    • Breast cancer Cousin         MATERNAL 40'S-50'S   • Ovarian cancer Other         MAT GREAT AUNT 50'S UTERINE/CERVICAL/OVARIAN      Social History     Socioeconomic History   • Marital status:    • Number of children: 2   Tobacco Use   • Smoking status: Every Day     Packs/day: 1.00     Years: 20.00     Pack years: 20.00     Types: Cigarettes     Passive exposure: Never   • Smokeless tobacco: Never   Vaping Use   • Vaping Use: Never used   Substance and Sexual Activity   • Alcohol use: Not Currently   • Drug use: Yes     Types: Marijuana     Comment: 3-4 times weekly   • Sexual activity: Yes     Partners: Male     Birth control/protection: Surgical       Objective     Vital Signs:  /68 (BP Location: Left arm, Patient Position: Sitting)   Pulse 82   Ht 167.6 cm (66\")   Wt 103 kg (228 lb)   SpO2 98%   BMI 36.80 kg/m²   Estimated body mass index is 36.8 kg/m² as calculated from the following:    Height as of this encounter: 167.6 cm (66\").    Weight as of this encounter: 103 kg (228 lb).       Class 2 Severe Obesity (BMI >=35 and <=39.9). Obesity-related health conditions include the following: obstructive sleep apnea and hypertension. Obesity is newly identified. BMI is is above average; BMI management plan is completed. We discussed portion control and increasing exercise.      Physical Exam  Cardiovascular:      Rate and Rhythm: Normal rate and regular rhythm.      Heart sounds: Normal heart sounds.   Pulmonary:      Effort: Pulmonary effort is normal.   Musculoskeletal: "      Right lower leg: Edema present.      Left lower leg: Edema present.   Skin:     General: Skin is warm and dry.   Neurological:      Mental Status: She is alert and oriented to person, place, and time.   Psychiatric:         Mood and Affect: Mood normal.                   Assessment and Plan     Diagnoses and all orders for this visit:    1. SOB (shortness of breath) on exertion  Comments:  Treat pulmonary hypertension, SOTERO, suggested she stop smoking, send to pulmonary.  Orders:  -     TSH Rfx On Abnormal To Free T4; Future  -     Comprehensive Metabolic Panel; Future  -     Ambulatory Referral to Pulmonology    2. Bilateral leg edema  Comments:  Continue HCTZ, furosemide, potassium  Orders:  -     TSH Rfx On Abnormal To Free T4; Future  -     Comprehensive Metabolic Panel; Future  -     Ambulatory Referral to Pulmonology    3. Hypokalemia  Comments:  Replace potassium and recheck in 3 weeks  Orders:  -     potassium chloride (K-DUR,KLOR-CON) 20 MEQ CR tablet; Take 1 tablet by mouth Daily. Take with Lasix  Dispense: 90 tablet; Refill: 0    4. Tobacco use  Comments:  Declines smoking cessation.  Orders:  -     Ambulatory Referral to Pulmonology    Other orders  -     furosemide (LASIX) 20 MG tablet; Take 1 tablet by mouth Daily As Needed (swelling).  Dispense: 90 tablet; Refill: 0        Recommendations: ER if symptoms increase, Report if any new/changing symptoms immediately and Stop cigarettes    Kiara Eason  reports that she has been smoking cigarettes. She has a 20.00 pack-year smoking history. She has never been exposed to tobacco smoke. She has never used smokeless tobacco.. I have educated her on the risk of diseases from using tobacco products such as cancer, COPD and heart disease.     I advised her to quit and she is not willing to quit.    I spent 3  minutes counseling the patient.      Follow Up  Return in about 3 months (around 8/24/2023) for Pulm HTn/edema.  Patient was given instructions and  counseling regarding her condition or for health maintenance advice. Please see specific information pulled into the AVS if appropriate.

## 2023-05-25 PROBLEM — E87.6 HYPOKALEMIA: Status: ACTIVE | Noted: 2023-05-25

## 2023-05-25 PROBLEM — R60.0 BILATERAL LEG EDEMA: Status: ACTIVE | Noted: 2023-05-25

## 2023-05-25 PROBLEM — Z72.0 TOBACCO USE: Status: ACTIVE | Noted: 2023-05-25

## 2023-06-14 ENCOUNTER — HOSPITAL ENCOUNTER (OUTPATIENT)
Dept: HOSPITAL 22 - RAD | Age: 48
End: 2023-06-14
Payer: MEDICARE

## 2023-06-14 ENCOUNTER — HOSPITAL ENCOUNTER (OUTPATIENT)
Age: 48
End: 2023-06-14
Payer: MEDICARE

## 2023-06-14 VITALS
DIASTOLIC BLOOD PRESSURE: 77 MMHG | HEART RATE: 98 BPM | OXYGEN SATURATION: 98 % | SYSTOLIC BLOOD PRESSURE: 133 MMHG | TEMPERATURE: 98.2 F | RESPIRATION RATE: 19 BRPM

## 2023-06-14 VITALS — BODY MASS INDEX: 34.7 KG/M2

## 2023-06-14 DIAGNOSIS — G89.29: ICD-10-CM

## 2023-06-14 DIAGNOSIS — M54.6: ICD-10-CM

## 2023-06-14 DIAGNOSIS — M54.2: Primary | ICD-10-CM

## 2023-06-14 DIAGNOSIS — M54.50: ICD-10-CM

## 2023-06-14 DIAGNOSIS — Z98.1: ICD-10-CM

## 2023-06-14 DIAGNOSIS — M54.12: Primary | ICD-10-CM

## 2023-06-14 DIAGNOSIS — M54.2: ICD-10-CM

## 2023-06-14 PROCEDURE — 99202 OFFICE O/P NEW SF 15 MIN: CPT

## 2023-06-14 PROCEDURE — G0463 HOSPITAL OUTPT CLINIC VISIT: HCPCS

## 2023-06-14 PROCEDURE — 72084 X-RAY EXAM ENTIRE SPI 6/> VW: CPT

## 2023-06-19 DIAGNOSIS — K21.9 GASTROESOPHAGEAL REFLUX DISEASE WITHOUT ESOPHAGITIS: ICD-10-CM

## 2023-06-19 RX ORDER — OMEPRAZOLE 20 MG/1
CAPSULE, DELAYED RELEASE ORAL
Qty: 30 CAPSULE | Refills: 2 | Status: SHIPPED | OUTPATIENT
Start: 2023-06-19

## 2023-08-13 DIAGNOSIS — K21.9 GASTROESOPHAGEAL REFLUX DISEASE WITHOUT ESOPHAGITIS: ICD-10-CM

## 2023-08-14 RX ORDER — OMEPRAZOLE 20 MG/1
CAPSULE, DELAYED RELEASE ORAL
Qty: 30 CAPSULE | Refills: 2 | Status: SHIPPED | OUTPATIENT
Start: 2023-08-14

## 2023-08-21 DIAGNOSIS — R60.0 BILATERAL LEG EDEMA: ICD-10-CM

## 2023-08-21 DIAGNOSIS — R06.02 SOB (SHORTNESS OF BREATH) ON EXERTION: ICD-10-CM

## 2023-09-01 ENCOUNTER — TELEPHONE (OUTPATIENT)
Dept: CARDIOLOGY | Facility: CLINIC | Age: 48
End: 2023-09-01

## 2023-09-01 ENCOUNTER — OFFICE VISIT (OUTPATIENT)
Dept: CARDIOLOGY | Facility: CLINIC | Age: 48
End: 2023-09-01
Payer: MEDICARE

## 2023-09-01 VITALS
BODY MASS INDEX: 36.8 KG/M2 | OXYGEN SATURATION: 97 % | HEIGHT: 66 IN | HEART RATE: 99 BPM | SYSTOLIC BLOOD PRESSURE: 138 MMHG | WEIGHT: 229 LBS | DIASTOLIC BLOOD PRESSURE: 78 MMHG

## 2023-09-01 DIAGNOSIS — R60.0 BILATERAL LEG EDEMA: ICD-10-CM

## 2023-09-01 DIAGNOSIS — E87.6 HYPOKALEMIA: ICD-10-CM

## 2023-09-01 DIAGNOSIS — G47.33 OSA (OBSTRUCTIVE SLEEP APNEA): ICD-10-CM

## 2023-09-01 RX ORDER — LEVOTHYROXINE SODIUM 0.1 MG/1
100 TABLET ORAL DAILY
Qty: 90 TABLET | Refills: 0 | Status: SHIPPED | OUTPATIENT
Start: 2023-09-01

## 2023-09-01 RX ORDER — ALBUTEROL SULFATE 90 UG/1
2 AEROSOL, METERED RESPIRATORY (INHALATION)
COMMUNITY
Start: 2023-06-26

## 2023-09-01 RX ORDER — ARIPIPRAZOLE 10 MG/1
10 TABLET ORAL
COMMUNITY

## 2023-09-01 RX ORDER — MONTELUKAST SODIUM 10 MG/1
1 TABLET ORAL DAILY
COMMUNITY
Start: 2023-08-22

## 2023-09-01 RX ORDER — LEVOTHYROXINE SODIUM 0.07 MG/1
75 TABLET ORAL DAILY
COMMUNITY
Start: 2023-05-30 | End: 2023-09-01 | Stop reason: DRUGHIGH

## 2023-09-01 NOTE — TELEPHONE ENCOUNTER
Patient stated that Dr. Harvey usually prescribes her Synthroid. Patient stated that she is currently on 75mcg. Patient verbalized understaning. [facility] [place]

## 2023-09-01 NOTE — TELEPHONE ENCOUNTER
HUB OK TO READ: please call her and advise we need to increase her synthroid to 100mcg and recheck labs  in two weeks and again in 6 weeks.  Ask her who normally prescribes her Synthroid?  So I can let them know the changes we have made.     LVM TO RETURN CALL

## 2023-09-01 NOTE — PROGRESS NOTES
[facility] Provider Note     Date:   2023   Name: Kiara Eason  :   1975  PCP: Virgilio Harvey MD    Chief Complaint   Patient presents with    Follow-up       Subjective     History of Present Illness  Kiara Eason is a 47 y.o. female who presents today for follow-up on edema, shortness of air, and SOTERO.  She is really not noticed any chest pain or change in shortness of air.  EKG today similar to past EKGs.  No obvious cardiac reason for edema, pulm htn is mild.    I had staff call her on 2023 regarding labs her potassium was 3.4, , TSH 7.20, T4 free 0.71.  Patient told staff that she had missed several doses over the last week and wanted to discuss with me in clinic.  She reports her Synthroid was changed to 75 mcg about 6 months ago.    She recently saw pulmonary and they doubled her Lasix for 1 week but she reports its not helping.  They also repeated lab work and chest x-ray.  She also reports pulm gave her 5 different ways to try to take potassium but it makes her vomit.  We will recheck potassium levels today since she is being on double Lasix without potassium and already had hypokalemia.  I have advised her to stop Lasix for now.  We will recheck labs in a couple weeks to see where her potassium is without any Lasix.  If she still hypokalemic we will send to endocrinology for her thyroid issues as well as the hypokalemia for further work-up.  Continue HCTZ for now.    2023 labs from pulmonary reviewed.  Unfortunately they did not check a BMP or TSH.  The patient's BNP was normal.    She is having difficulty wearing her BiPAP as she is having panic attacks with the mask on her face and throws it off in her sleep.  Her narcolepsy and insomnia are followed by  sleep.    2023 1152 [facility] lab called with critical of K 3.0.  Spoke with Dr. Mars and since patient is unable to tolerate oral potassium we will have staff call her and advise  she go to [facility] ER or ER of her choice for IV potassium replacement.  Also asked staff to remind her to not take Lasix anymore.  And asked them to check to see if her TSH level is back.    9/1/2023 1431 TSH back and still abnormal even with patient taking Synthroid 50mcg regularly recently, will increase to 100mcg and recheck in 4-6 weeks.  I have asked staff to call PCP to let them know.  Hopefully, this will help the edema.    SOTERO  Edema  Shortness of air  Mild pulmonary hypertension  Narcolepsy/insomnia- sleep  Unable to tolerate Potassium      3/21/23 Echo  3/21/23 Holter    Allergies   Allergen Reactions    Benadryl [Diphenhydramine Hcl (Sleep)] Anaphylaxis     But does ok with loratadine        Current Outpatient Medications:     albuterol sulfate  (90 Base) MCG/ACT inhaler, Inhale 2 puffs., Disp: , Rfl:     ARIPiprazole (ABILIFY) 10 MG tablet, Take 1 tablet by mouth., Disp: , Rfl:     [employer]S Allergy Relief D  MG per 12 hr tablet, Take 1 tablet by mouth Every 12 (Twelve) Hours., Disp: , Rfl:     folic acid (FOLVITE) 1 MG tablet, Take 1 tablet by mouth Daily., Disp: , Rfl:     hydroCHLOROthiazide (HYDRODIURIL) 25 MG tablet, TAKE 1 TABLET BY MOUTH EVERY DAY, Disp: 90 tablet, Rfl: 1    ibuprofen (ADVIL,MOTRIN) 800 MG tablet, Take 1 tablet by mouth 3 (Three) Times a Day., Disp: , Rfl:     mirtazapine (REMERON) 15 MG tablet, , Disp: , Rfl:     montelukast (SINGULAIR) 10 MG tablet, Take 1 tablet by mouth Daily., Disp: , Rfl:     omeprazole (priLOSEC) 20 MG capsule, TAKE 1 CAPSULE BY MOUTH EVERY DAY, Disp: 30 capsule, Rfl: 2    prazosin (MINIPRESS) 5 MG capsule, 1 capsule Daily., Disp: , Rfl:     topiramate (TOPAMAX) 100 MG tablet, Take 1 tablet by mouth every night at bedtime., Disp: , Rfl:     venlafaxine XR (EFFEXOR-XR) 150 MG 24 hr capsule, , Disp: , Rfl:     Wakix 17.8 MG tablet, , Disp: , Rfl:     levothyroxine (Synthroid) 100 MCG tablet, Take 1 tablet by mouth Daily., Disp: 90 tablet, Rfl: 0    Past  "Medical History:   Diagnosis Date    Acid reflux     Allergic     Anxiety     Chronic pain disorder     Depression     Disease of thyroid gland     Gallbladder abscess     Hypertension     Migraines       Past Surgical History:   Procedure Laterality Date    CARPAL TUNNEL RELEASE Bilateral     CERVICAL CONIZATION      CHOLECYSTECTOMY      THYROID SURGERY      RADIATION     TONSILLECTOMY      TUBAL ABDOMINAL LIGATION       Family History   Problem Relation Age of Onset    Myelodysplastic syndrome Mother     Pneumonia Father     Alcohol abuse Father     Osteoporosis Maternal Grandmother     Breast cancer Cousin         MATERNAL 40'S-50'S    Ovarian cancer Other         MAT GREAT AUNT 50'S UTERINE/CERVICAL/OVARIAN      Social History     Socioeconomic History    Marital status:     Number of children: 2   Tobacco Use    Smoking status: Every Day     Packs/day: 1.00     Years: 20.00     Pack years: 20.00     Types: Cigarettes     Passive exposure: Never    Smokeless tobacco: Never   Vaping Use    Vaping Use: Never used   Substance and Sexual Activity    Alcohol use: Not Currently    Drug use: Yes     Types: Marijuana     Comment: 3-4 times weekly    Sexual activity: Yes     Partners: Male     Birth control/protection: Surgical       Objective     Vital Signs:  /78 (BP Location: Left arm)   Pulse 99   Ht 167.6 cm (66\")   Wt 104 kg (229 lb)   SpO2 97%   BMI 36.96 kg/mý   Estimated body mass index is 36.96 kg/mý as calculated from the following:    Height as of this encounter: 167.6 cm (66\").    Weight as of this encounter: 104 kg (229 lb).               Physical Exam  Vitals reviewed.   Cardiovascular:      Rate and Rhythm: Normal rate and regular rhythm.      Heart sounds: Normal heart sounds.   Pulmonary:      Effort: Pulmonary effort is normal.   Musculoskeletal:         General: Normal range of motion.   Skin:     General: Skin is warm and dry.   Neurological:      Mental Status: She is alert and " oriented to person, place, and time.   Psychiatric:         Mood and Affect: Mood normal.         Behavior: Behavior normal.               ECG 12 Lead    Date/Time: 9/1/2023 9:59 AM  Performed by: Lakisha Dolan APRN  Authorized by: Lakisha Dolan APRN   Comparison: compared with previous ECG from 3/21/2023  Comparison to previous ECG: Overall same  Rhythm: sinus rhythm  Other findings: T wave abnormality    Clinical impression: abnormal EKG         Assessment and Plan     Diagnoses and all orders for this visit:    1. Bilateral leg edema  Comments:  Correct thyroid levels.  Orders:  -     Basic Metabolic Panel; Future  -     TSH Rfx On Abnormal To Free T4; Future  -     ECG 12 Lead  -     Basic Metabolic Panel; Future  -     TSH Rfx On Abnormal To Free T4; Future    2. Hypokalemia  Comments:  Stop Lasix.  Unable to tolerate oral K.  Updated labs then sent patient to ER for IV replacement.  Orders:  -     Basic Metabolic Panel; Future  -     TSH Rfx On Abnormal To Free T4; Future  -     ECG 12 Lead  -     Basic Metabolic Panel; Future  -     TSH Rfx On Abnormal To Free T4; Future    3. SOTERO (obstructive sleep apnea)  Comments:  Not using PAP.    Other orders  -     levothyroxine (Synthroid) 100 MCG tablet; Take 1 tablet by mouth Daily.  Dispense: 90 tablet; Refill: 0        Recommendations: ER if symptoms increase and Report if any new/changing symptoms immediately              Follow Up  No follow-ups on file.    DANIELLE Shook   09/01/2023     Please note that this explicitly excludes time spent on other separate billable services such as performing procedures or test interpretation, when applicable.    This note was created using dictation software which occasionally transcribes nonsensical phrases. Please contact the provider if any clarification is needed.

## 2023-09-01 NOTE — TELEPHONE ENCOUNTER
Kadie alvarez/ [facility] lab called and reported critical lab value of:     Potassium: 3.0    Please advise

## 2023-09-01 NOTE — TELEPHONE ENCOUNTER
Patient notified of low potassium. Patient advised to go to ER of choosing for IV potassium replacement. Patient verbalized understanding.     Other labs scanned into chart.

## 2023-09-05 NOTE — TELEPHONE ENCOUNTER
Faxed office note and all labs to Dr. Harvey office along with notes on levothyroxine dose change and instructing patient to go to ER for IV potassium replacement.

## 2023-09-11 NOTE — TELEPHONE ENCOUNTER
Just checking to make sure you didn't want to restart the Lasix patient already picked up Levothyroxine 100 mg

## 2023-09-11 NOTE — TELEPHONE ENCOUNTER
Records reviewed.  Patient received IV and p.o. potassium in the emergency room recently.  We probably need to recheck her potassium and also schedule a follow-up appointment.  Several things were ordered up last appointment.  I do not see a follow-up on file.  Please schedule in a few weeks.  Any schedule.  HUB okay to read.

## 2023-09-12 RX ORDER — FUROSEMIDE 20 MG/1
20 TABLET ORAL DAILY PRN
Qty: 90 TABLET | Refills: 0 | OUTPATIENT
Start: 2023-09-12

## 2023-09-12 RX ORDER — LEVOTHYROXINE SODIUM 0.1 MG/1
TABLET ORAL
Qty: 90 TABLET | Refills: 0 | OUTPATIENT
Start: 2023-09-12

## 2023-09-12 NOTE — TELEPHONE ENCOUNTER
Informed patient of message below. Patient verbalized understanding. Scheduled f/u appt. Faxed order to [facility]

## 2023-10-11 ENCOUNTER — OFFICE VISIT (OUTPATIENT)
Dept: CARDIOLOGY | Facility: CLINIC | Age: 48
End: 2023-10-11
Payer: MEDICARE

## 2023-10-11 VITALS
SYSTOLIC BLOOD PRESSURE: 140 MMHG | HEART RATE: 76 BPM | DIASTOLIC BLOOD PRESSURE: 82 MMHG | WEIGHT: 233 LBS | BODY MASS INDEX: 37.45 KG/M2 | OXYGEN SATURATION: 96 % | HEIGHT: 66 IN

## 2023-10-11 DIAGNOSIS — R60.0 BILATERAL LEG EDEMA: ICD-10-CM

## 2023-10-11 DIAGNOSIS — E03.9 HYPOTHYROIDISM, UNSPECIFIED TYPE: ICD-10-CM

## 2023-10-11 DIAGNOSIS — E87.6 HYPOKALEMIA: ICD-10-CM

## 2023-10-11 RX ORDER — POTASSIUM CHLORIDE 20MEQ/15ML
10 LIQUID (ML) ORAL DAILY
Qty: 225 ML | Refills: 2 | Status: SHIPPED | OUTPATIENT
Start: 2023-10-11

## 2023-10-11 RX ORDER — POTASSIUM CHLORIDE 20MEQ/15ML
LIQUID (ML) ORAL
COMMUNITY
Start: 2023-09-05 | End: 2023-10-11 | Stop reason: DRUGHIGH

## 2023-10-11 RX ORDER — FUROSEMIDE 20 MG/1
20 TABLET ORAL DAILY PRN
COMMUNITY
Start: 2023-09-06

## 2023-10-11 RX ORDER — SULFAMETHOXAZOLE AND TRIMETHOPRIM 800; 160 MG/1; MG/1
1 TABLET ORAL
COMMUNITY
Start: 2023-06-26 | End: 2023-10-11 | Stop reason: ALTCHOICE

## 2023-10-11 NOTE — PROGRESS NOTES
[facility] Provider Note     Date:   10/11/2023   Name: Kiara Eason  :   1975  PCP: Virgilio Harvey MD    Chief Complaint   Patient presents with    Follow-up       Subjective     History of Present Illness  Kiara Eason is a 47 y.o. female who presents today for follow-up on potassium.  She ran out of potassium liquid that Er gave her.  She was able to tolerate is better than the pill.  We will restart it at 10 MEQ daily since patient is only using Lasix PRN and recheck Potassium in five days as well as refer patient to Endocrinology for Potassium issues and thyroid issues.    Patient Saw  Sleep for insomnia/SOTERO/Narcolepsy - sees them back in Nov/Dec; struggling to use pap r/t allergies.  Feels better when she can use it.    Continues to have bilat LE edema; advise compression stockings.    RTC in 6 months.    SOTERO  Edema  Shortness of air  Mild pulmonary hypertension  Narcolepsy/insomnia- sleep  Unable to tolerate Potassium      3/21/23 Echo  3/21/23 Holter    Allergies   Allergen Reactions    Benadryl [Diphenhydramine Hcl (Sleep)] Anaphylaxis     But does ok with loratadine     Diphenhydramine Anaphylaxis       Current Outpatient Medications:     albuterol sulfate  (90 Base) MCG/ACT inhaler, Inhale 2 puffs., Disp: , Rfl:     ARIPiprazole (ABILIFY) 10 MG tablet, Take 1 tablet by mouth., Disp: , Rfl:     CVS Allergy Relief D  MG per 12 hr tablet, Take 1 tablet by mouth Every 12 (Twelve) Hours., Disp: , Rfl:     folic acid (FOLVITE) 1 MG tablet, Take 1 tablet by mouth Daily., Disp: , Rfl:     furosemide (LASIX) 20 MG tablet, Take 1 tablet by mouth Daily As Needed., Disp: , Rfl:     hydroCHLOROthiazide (HYDRODIURIL) 25 MG tablet, TAKE 1 TABLET BY MOUTH EVERY DAY, Disp: 90 tablet, Rfl: 1    ibuprofen (ADVIL,MOTRIN) 800 MG tablet, Take 1 tablet by mouth 3 (Three) Times a Day., Disp: , Rfl:     levothyroxine (Synthroid) 100 MCG tablet, Take 1 tablet by  mouth Daily., Disp: 90 tablet, Rfl: 0    mirtazapine (REMERON) 15 MG tablet, , Disp: , Rfl:     montelukast (SINGULAIR) 10 MG tablet, Take 1 tablet by mouth Daily., Disp: , Rfl:     omeprazole (priLOSEC) 20 MG capsule, TAKE 1 CAPSULE BY MOUTH EVERY DAY, Disp: 30 capsule, Rfl: 2    prazosin (MINIPRESS) 5 MG capsule, 1 capsule Daily., Disp: , Rfl:     venlafaxine XR (EFFEXOR-XR) 150 MG 24 hr capsule, , Disp: , Rfl:     Wakix 17.8 MG tablet, , Disp: , Rfl:     potassium chloride (KAYCIEL) 20 mEq/15 mL solution, Take 7.5 mL by mouth Daily., Disp: 225 mL, Rfl: 2    Past Medical History:   Diagnosis Date    Acid reflux     Allergic     Anxiety     Chronic pain disorder     Depression     Disease of thyroid gland     Gallbladder abscess     Hypertension     Migraines       Past Surgical History:   Procedure Laterality Date    CARPAL TUNNEL RELEASE Bilateral     CERVICAL CONIZATION      CHOLECYSTECTOMY      THYROID SURGERY      RADIATION     TONSILLECTOMY      TUBAL ABDOMINAL LIGATION       Family History   Problem Relation Age of Onset    Myelodysplastic syndrome Mother     Pneumonia Father     Alcohol abuse Father     Osteoporosis Maternal Grandmother     Breast cancer Cousin         MATERNAL 40'S-50'S    Ovarian cancer Other         MAT GREAT AUNT 50'S UTERINE/CERVICAL/OVARIAN      Social History     Socioeconomic History    Marital status:     Number of children: 2   Tobacco Use    Smoking status: Every Day     Packs/day: 1.00     Years: 20.00     Additional pack years: 0.00     Total pack years: 20.00     Types: Cigarettes     Passive exposure: Never    Smokeless tobacco: Never   Vaping Use    Vaping Use: Never used   Substance and Sexual Activity    Alcohol use: Not Currently    Drug use: Yes     Types: Marijuana     Comment: 3-4 times weekly    Sexual activity: Yes     Partners: Male     Birth control/protection: Surgical       Objective     Vital Signs:  /82 (BP Location: Left arm)   Pulse 76   Ht  "167.6 cm (66\")   Wt 106 kg (233 lb)   SpO2 96%   BMI 37.61 kg/mý   Estimated body mass index is 37.61 kg/mý as calculated from the following:    Height as of this encounter: 167.6 cm (66\").    Weight as of this encounter: 106 kg (233 lb).               Physical Exam  Vitals reviewed.   Cardiovascular:      Rate and Rhythm: Normal rate and regular rhythm.      Heart sounds: Normal heart sounds.   Pulmonary:      Effort: Pulmonary effort is normal.   Musculoskeletal:         General: Normal range of motion.   Skin:     General: Skin is warm and dry.   Neurological:      Mental Status: She is alert and oriented to person, place, and time.   Psychiatric:         Mood and Affect: Mood normal.                     Assessment and Plan     Diagnoses and all orders for this visit:    1. Hypokalemia  Comments:  Restart potassium liquid, recheck potassium in 5 days, referred to endocrinology.  Orders:  -     Basic Metabolic Panel; Future    2. Hypothyroidism, unspecified type  Comments:  Refer to endocrinology  Orders:  -     Ambulatory Referral to Endocrinology    3. Bilateral leg edema  Comments:  Lasix as needed.  Suggest compression stockings.    Other orders  -     potassium chloride (KAYCIEL) 20 mEq/15 mL solution; Take 7.5 mL by mouth Daily.  Dispense: 225 mL; Refill: 2        Recommendations: ER if symptoms increase, Report if any new/changing symptoms immediately, Elevate legs, and Compression hose              Follow Up  Return in about 6 months (around 4/11/2024).    Lakisha Dolan, APRN   10/11/2023     Please note that this explicitly excludes time spent on other separate billable services such as performing procedures or test interpretation, when applicable.    This note was created using dictation software which occasionally transcribes nonsensical phrases. Please contact the provider if any clarification is needed.   "

## 2023-10-17 ENCOUNTER — OFFICE VISIT (OUTPATIENT)
Dept: ENDOCRINOLOGY | Facility: CLINIC | Age: 48
End: 2023-10-17
Payer: MEDICARE

## 2023-10-17 VITALS
OXYGEN SATURATION: 99 % | HEIGHT: 66 IN | HEART RATE: 96 BPM | BODY MASS INDEX: 36.32 KG/M2 | DIASTOLIC BLOOD PRESSURE: 80 MMHG | WEIGHT: 226 LBS | SYSTOLIC BLOOD PRESSURE: 140 MMHG

## 2023-10-17 DIAGNOSIS — E89.0 POSTABLATIVE HYPOTHYROIDISM: Primary | ICD-10-CM

## 2023-10-17 DIAGNOSIS — E87.6 LOW SERUM POTASSIUM LEVEL: ICD-10-CM

## 2023-10-17 LAB
ANION GAP SERPL CALCULATED.3IONS-SCNC: 11.7 MMOL/L (ref 5–15)
BUN SERPL-MCNC: 7 MG/DL (ref 6–20)
BUN/CREAT SERPL: 8.9 (ref 7–25)
CALCIUM SPEC-SCNC: 9.1 MG/DL (ref 8.6–10.5)
CHLORIDE SERPL-SCNC: 104 MMOL/L (ref 98–107)
CO2 SERPL-SCNC: 24.3 MMOL/L (ref 22–29)
CREAT SERPL-MCNC: 0.79 MG/DL (ref 0.57–1)
EGFRCR SERPLBLD CKD-EPI 2021: 93 ML/MIN/1.73
GLUCOSE SERPL-MCNC: 81 MG/DL (ref 65–99)
POTASSIUM SERPL-SCNC: 3.8 MMOL/L (ref 3.5–5.2)
SODIUM SERPL-SCNC: 140 MMOL/L (ref 136–145)
T4 FREE SERPL-MCNC: 0.96 NG/DL (ref 0.93–1.7)
TSH SERPL DL<=0.05 MIU/L-ACNC: 8.06 UIU/ML (ref 0.27–4.2)

## 2023-10-17 PROCEDURE — 80048 BASIC METABOLIC PNL TOTAL CA: CPT | Performed by: INTERNAL MEDICINE

## 2023-10-17 PROCEDURE — 82088 ASSAY OF ALDOSTERONE: CPT | Performed by: INTERNAL MEDICINE

## 2023-10-17 PROCEDURE — 99204 OFFICE O/P NEW MOD 45 MIN: CPT | Performed by: INTERNAL MEDICINE

## 2023-10-17 PROCEDURE — 84439 ASSAY OF FREE THYROXINE: CPT | Performed by: INTERNAL MEDICINE

## 2023-10-17 PROCEDURE — 36415 COLL VENOUS BLD VENIPUNCTURE: CPT | Performed by: INTERNAL MEDICINE

## 2023-10-17 PROCEDURE — 84244 ASSAY OF RENIN: CPT | Performed by: INTERNAL MEDICINE

## 2023-10-17 PROCEDURE — 84443 ASSAY THYROID STIM HORMONE: CPT | Performed by: INTERNAL MEDICINE

## 2023-10-17 NOTE — PROGRESS NOTES
Chief Complaint   Patient presents with    Hypothyroidism        New patient who is being seen in consultation regarding hypothyroidism at the request of Lakisha Dolan*     HPI   Kiara Eason is a 47 y.o. female who presents for evaluation of hypothyroidism.  She also reports that referring provider wanted us to discuss low potassium.    Patient has a historical diagnosis of hyperthyroidism which was treated with radioactive iodine ablation in approximately 2007.  She was subsequently started on thyroid hormone replacement.  Dose was changed from 50 mcg to 100 mcg daily approximately 3 months ago.  She takes this routinely without missed doses but does report that she generally takes this with all of her other medications and eats within 30 minutes of taking medication.    Patient reports she is unsure how long she has had issues with low potassium.  She reports that another provider told her this was chronic and she is unsure how long this has been present.  She is currently prescribed 10 mEq of liquid potassium daily.  She did not tolerate potassium pills.  She reports that she is taking liquid potassium only intermittently, she estimates she last took this a few days ago.  Of note, patient is prescribed both Lasix and hydrochlorothiazide on an as-needed basis for swelling.  She reports she has not taken either of these medications in a few days.  She is unsure if hypokalemia was present before these medications were started.  She reports she has not had any further evaluation of hypokalemia.  She does have a primary care physician but states she generally only sees this provider when ill.  Patient does have a history of hypertension.    Past Medical History:   Diagnosis Date    Acid reflux     Allergic     Anxiety     Chronic pain disorder     Depression     Disease of thyroid gland     Gallbladder abscess     Hypertension     Migraines      Past Surgical History:   Procedure Laterality Date     BLADDER REPAIR      sling    CARPAL TUNNEL RELEASE Bilateral     CERVICAL CONIZATION      CHOLECYSTECTOMY      THYROID SURGERY      RADIATION     TONSILLECTOMY      TUBAL ABDOMINAL LIGATION        Family History   Problem Relation Age of Onset    Thyroid disease Mother     Myelodysplastic syndrome Mother     Mental illness Mother     Hepatitis Mother         C    Mental illness Father     Pneumonia Father     Alcohol abuse Father     Cancer Cousin     Breast cancer Cousin         MATERNAL 40'S-50'S    Ovarian cancer Other         MAT GREAT AUNT 50'S UTERINE/CERVICAL/OVARIAN     Lung cancer Other       Social History     Socioeconomic History    Marital status:     Number of children: 2   Tobacco Use    Smoking status: Every Day     Packs/day: 1.00     Years: 20.00     Additional pack years: 0.00     Total pack years: 20.00     Types: Cigarettes     Passive exposure: Never    Smokeless tobacco: Never   Vaping Use    Vaping Use: Never used   Substance and Sexual Activity    Alcohol use: Not Currently    Drug use: Yes     Types: Marijuana     Comment: 3-4 times weekly    Sexual activity: Yes     Partners: Male     Birth control/protection: Surgical      Allergies   Allergen Reactions    Benadryl [Diphenhydramine Hcl (Sleep)] Anaphylaxis     But does ok with loratadine     Diphenhydramine Anaphylaxis      Current Outpatient Medications on File Prior to Visit   Medication Sig Dispense Refill    albuterol sulfate  (90 Base) MCG/ACT inhaler Inhale 2 puffs.      ARIPiprazole (ABILIFY) 10 MG tablet Take 1 tablet by mouth.      folic acid (FOLVITE) 1 MG tablet Take 1 tablet by mouth Daily.      furosemide (LASIX) 20 MG tablet Take 1 tablet by mouth Daily As Needed.      hydroCHLOROthiazide (HYDRODIURIL) 25 MG tablet TAKE 1 TABLET BY MOUTH EVERY DAY 90 tablet 1    ibuprofen (ADVIL,MOTRIN) 800 MG tablet Take 1 tablet by mouth 3 (Three) Times a Day.      levothyroxine (Synthroid) 100 MCG tablet Take 1 tablet by  "mouth Daily. 90 tablet 0    mirtazapine (REMERON) 15 MG tablet       montelukast (SINGULAIR) 10 MG tablet Take 1 tablet by mouth Daily.      omeprazole (priLOSEC) 20 MG capsule TAKE 1 CAPSULE BY MOUTH EVERY DAY 30 capsule 2    potassium chloride (KAYCIEL) 20 mEq/15 mL solution Take 7.5 mL by mouth Daily. (Patient taking differently: Take 7.5 mL by mouth Daily As Needed.) 225 mL 2    prazosin (MINIPRESS) 5 MG capsule 1 capsule Daily.      venlafaxine XR (EFFEXOR-XR) 150 MG 24 hr capsule       Wakix 17.8 MG tablet       [DISCONTINUED] CVS Allergy Relief D  MG per 12 hr tablet Take 1 tablet by mouth Every 12 (Twelve) Hours.       No current facility-administered medications on file prior to visit.        Review of Systems   Constitutional:  Positive for fatigue.   HENT:  Positive for sinus pressure.    Eyes:  Positive for photophobia and itching.   Cardiovascular:  Positive for leg swelling.   Gastrointestinal:  Positive for abdominal pain and GERD.   Endocrine: Positive for heat intolerance.   Genitourinary:  Positive for urinary incontinence.   Musculoskeletal:  Positive for arthralgias, back pain, myalgias, neck pain and neck stiffness.   Allergic/Immunologic: Positive for environmental allergies.   Neurological:  Positive for headache.   Psychiatric/Behavioral:  Positive for agitation and sleep disturbance. The patient is nervous/anxious.       Vitals:    10/17/23 1300   BP: 140/80   BP Location: Right arm   Patient Position: Sitting   Cuff Size: Adult   Pulse: 96   SpO2: 99%   Weight: 103 kg (226 lb)   Height: 167.6 cm (66\")   Body mass index is 36.48 kg/m².     Physical Exam  Vitals reviewed.   Constitutional:       General: She is not in acute distress.  HENT:      Head: Normocephalic and atraumatic.   Neck:      Thyroid: No thyromegaly.   Cardiovascular:      Rate and Rhythm: Normal rate and regular rhythm.   Pulmonary:      Effort: Pulmonary effort is normal.      Breath sounds: Normal breath sounds. "   Lymphadenopathy:      Cervical: No cervical adenopathy.   Skin:     General: Skin is warm and dry.   Neurological:      General: No focal deficit present.      Mental Status: She is alert.   Psychiatric:         Mood and Affect: Mood and affect normal.         Behavior: Behavior is cooperative.        Labs/Imaging  Labs dated 9/1/2023  TSH 5.38    Labs dated 8/21/2023  TSH 7.2  Free T4 0.71    Assessment and Plan    Diagnoses and all orders for this visit:    1. Postablative hypothyroidism (Primary)  -     TSH; Future  -     T4, Free; Future  Patient underwent radioactive iodine ablation for hyperthyroidism.  Patient is currently taking levothyroxine 100 mcg daily.  She is taking this in close proximity to food.  Discussed concerns for malabsorption.  Appropriate administration of thyroid hormone was reviewed.  We will plan to repeat labs 6 weeks following change in administration.  Patient was given orders to complete these labs locally.  Reviewed potential adverse effects of inadequately treated hypothyroidism.  Symptoms of thyroid hormone abnormality reviewed, patient will contact the clinic in the interim between visits with any concerning changes.    2. Low serum potassium level  -     Basic Metabolic Panel; Future  -     Aldosterone / Renin Ratio; Future  Available labs reviewed.  It is unclear how long patient has had hypokalemia.  Discussed with patient that she is taking multiple diuretics that can result in potassium wasting.  Patient voiced understanding.  Reviewed with patient that differential diagnosis of hypokalemia is broad and contains many non-endocrine etiologies.  We will screen for hyperaldosteronism given history of hypertension and work to normalize thyroid function, as above.  In the interim, patient will continue potassium supplementation, as prescribed.  If endocrine evaluation is normal, we discussed that she will need to seek further evaluation with her primary care physician.      Return in about 4 months (around 2/17/2024). The patient was instructed to contact the clinic with any interval questions or concerns.    Tiffani Benson MD     Dictated Utilizing Dragon Dictation

## 2023-10-23 ENCOUNTER — TELEPHONE (OUTPATIENT)
Dept: ENDOCRINOLOGY | Facility: CLINIC | Age: 48
End: 2023-10-23
Payer: MEDICARE

## 2023-10-24 LAB
ALDOST SERPL-MCNC: 6.1 NG/DL (ref 0–30)
ALDOST/RENIN PLAS-RTO: >36.5 {RATIO} (ref 0–30)
RENIN PLAS-CCNC: <0.167 NG/ML/HR (ref 0.17–5.38)

## 2023-11-08 DIAGNOSIS — K21.9 GASTROESOPHAGEAL REFLUX DISEASE WITHOUT ESOPHAGITIS: ICD-10-CM

## 2023-11-08 RX ORDER — OMEPRAZOLE 20 MG/1
CAPSULE, DELAYED RELEASE ORAL
Qty: 90 CAPSULE | Refills: 2 | Status: SHIPPED | OUTPATIENT
Start: 2023-11-08

## 2024-04-15 ENCOUNTER — OFFICE VISIT (OUTPATIENT)
Dept: CARDIOLOGY | Facility: CLINIC | Age: 49
End: 2024-04-15
Payer: MEDICARE

## 2024-04-15 VITALS
HEIGHT: 66 IN | HEART RATE: 87 BPM | BODY MASS INDEX: 37.77 KG/M2 | WEIGHT: 235 LBS | OXYGEN SATURATION: 96 % | DIASTOLIC BLOOD PRESSURE: 80 MMHG | SYSTOLIC BLOOD PRESSURE: 150 MMHG

## 2024-04-15 DIAGNOSIS — I10 HYPERTENSION, ESSENTIAL: Primary | ICD-10-CM

## 2024-04-15 DIAGNOSIS — R00.2 PALPITATIONS: ICD-10-CM

## 2024-04-15 PROCEDURE — 99214 OFFICE O/P EST MOD 30 MIN: CPT | Performed by: INTERNAL MEDICINE

## 2024-04-15 RX ORDER — LEVOTHYROXINE SODIUM 0.15 MG/1
150 TABLET ORAL DAILY
COMMUNITY

## 2024-04-15 RX ORDER — ARIPIPRAZOLE 15 MG/1
TABLET ORAL
COMMUNITY
Start: 2024-02-16

## 2024-04-15 RX ORDER — BUDESONIDE AND FORMOTEROL FUMARATE DIHYDRATE 80; 4.5 UG/1; UG/1
2 AEROSOL RESPIRATORY (INHALATION)
COMMUNITY

## 2024-04-15 RX ORDER — ATORVASTATIN CALCIUM 80 MG/1
80 TABLET, FILM COATED ORAL
COMMUNITY
Start: 2024-03-22

## 2024-04-15 RX ORDER — AMLODIPINE BESYLATE 5 MG/1
5 TABLET ORAL DAILY
Qty: 30 TABLET | Refills: 11 | Status: SHIPPED | OUTPATIENT
Start: 2024-04-15

## 2024-04-15 RX ORDER — ERGOCALCIFEROL 1.25 MG/1
1 CAPSULE ORAL WEEKLY
COMMUNITY
Start: 2024-03-22

## 2024-04-15 RX ORDER — LOSARTAN POTASSIUM 100 MG/1
1 TABLET ORAL DAILY
COMMUNITY
Start: 2024-03-22

## 2024-04-15 RX ORDER — EMPAGLIFLOZIN 25 MG/1
1 TABLET, FILM COATED ORAL DAILY
COMMUNITY
Start: 2024-03-22

## 2024-04-15 NOTE — PROGRESS NOTES
[facility] Provider Note     Date:   04/15/2024   Name: Kiara Eason  :   1975  PCP: Lynette Alvarado APRN    Chief Complaint   Patient presents with    Follow-up     Referral to Endo        Subjective     History of Present Illness  Kiara Eason is a 48 y.o. female who presents today for follow-up  Blood pressure.  We also sent her to endocrine for her potassium issues as well as her hypothyroidism requiring high doses of Synthroid.  She said the appointment did not go well and they did not understand why she was there.  BP has been running high. No more chest pain, not like  it was. Still short of rbeath. No worse, no better.   Palpitations with panic attacks only butnot other times.  She knows definitely what it is now.    SOTERO  Hypothyroid  COPD  Mild pulmonary hypertension  Narcolepsy/insomnia- sleep  Unable to tolerate Potassium      3/21/23 Echo  3/21/23 Holter    Allergies   Allergen Reactions    Benadryl [Diphenhydramine Hcl (Sleep)] Anaphylaxis     But does ok with loratadine     Diphenhydramine Anaphylaxis       Current Outpatient Medications:     albuterol sulfate  (90 Base) MCG/ACT inhaler, Inhale 2 puffs., Disp: , Rfl:     ARIPiprazole (ABILIFY) 15 MG tablet, Take 1 oral tablet once a day, Disp: , Rfl:     atorvastatin (LIPITOR) 80 MG tablet, Take 1 tablet by mouth every night at bedtime., Disp: , Rfl:     Jardiance 25 MG tablet tablet, Take 1 tablet by mouth Daily., Disp: , Rfl:     levothyroxine (SYNTHROID, LEVOTHROID) 150 MCG tablet, Take 1 tablet by mouth Daily., Disp: , Rfl:     losartan (COZAAR) 100 MG tablet, Take 1 tablet by mouth Daily., Disp: , Rfl:     mirtazapine (REMERON) 15 MG tablet, , Disp: , Rfl:     montelukast (SINGULAIR) 10 MG tablet, Take 1 tablet by mouth Daily., Disp: , Rfl:     Nicotine Step 1 21 MG/24HR patch, Apply 1 patch every day by transdermal route for 28 days., Disp: , Rfl:     omeprazole (priLOSEC) 20 MG  capsule, TAKE 1 CAPSULE BY MOUTH EVERY DAY, Disp: 90 capsule, Rfl: 2    prazosin (MINIPRESS) 5 MG capsule, 1 capsule Daily., Disp: , Rfl:     venlafaxine XR (EFFEXOR-XR) 150 MG 24 hr capsule, , Disp: , Rfl:     vitamin D (ERGOCALCIFEROL) 1.25 MG (79971 UT) capsule capsule, Take 1 capsule by mouth 1 (One) Time Per Week., Disp: , Rfl:     Wakix 17.8 MG tablet, , Disp: , Rfl:     amLODIPine (NORVASC) 5 MG tablet, Take 1 tablet by mouth Daily., Disp: 30 tablet, Rfl: 11    budesonide-formoterol (SYMBICORT) 80-4.5 MCG/ACT inhaler, Inhale 2 puffs 2 (Two) Times a Day., Disp: , Rfl:     Past Medical History:   Diagnosis Date    Acid reflux     Allergic     Anxiety     Chronic pain disorder     Depression     Disease of thyroid gland     Gallbladder abscess     Hypertension     Migraines       Past Surgical History:   Procedure Laterality Date    BLADDER REPAIR      sling    CARPAL TUNNEL RELEASE Bilateral     CERVICAL CONIZATION      CHOLECYSTECTOMY      THYROID SURGERY      RADIATION     TONSILLECTOMY      TUBAL ABDOMINAL LIGATION       Family History   Problem Relation Age of Onset    Thyroid disease Mother     Myelodysplastic syndrome Mother     Mental illness Mother     Hepatitis Mother         C    Mental illness Father     Pneumonia Father     Alcohol abuse Father     Cancer Cousin     Breast cancer Cousin         MATERNAL 40'S-50'S    Ovarian cancer Other         MAT GREAT AUNT 50'S UTERINE/CERVICAL/OVARIAN     Lung cancer Other      Social History     Socioeconomic History    Marital status:     Number of children: 2   Tobacco Use    Smoking status: Every Day     Current packs/day: 1.00     Average packs/day: 1 pack/day for 20.0 years (20.0 ttl pk-yrs)     Types: Cigarettes     Passive exposure: Never    Smokeless tobacco: Never   Vaping Use    Vaping status: Never Used   Substance and Sexual Activity    Alcohol use: Not Currently    Drug use: Yes     Types: Marijuana     Comment: 3-4 times weekly    Sexual  "activity: Yes     Partners: Male     Birth control/protection: Surgical       Objective     Vital Signs:  /80 (BP Location: Right arm)   Pulse 87   Ht 167.6 cm (66\")   Wt 107 kg (235 lb)   SpO2 96%   BMI 37.93 kg/m²   Estimated body mass index is 37.93 kg/m² as calculated from the following:    Height as of this encounter: 167.6 cm (66\").    Weight as of this encounter: 107 kg (235 lb).         Physical Exam  Vitals reviewed.   Constitutional:       General: She is not in acute distress.     Appearance: Normal appearance.   HENT:      Head: Normocephalic and atraumatic.      Mouth/Throat:      Mouth: Mucous membranes are moist.   Eyes:      Conjunctiva/sclera: Conjunctivae normal.   Neck:      Vascular: No carotid bruit.   Cardiovascular:      Rate and Rhythm: Normal rate and regular rhythm.      Pulses: Normal pulses.      Heart sounds: Normal heart sounds. No murmur heard.  Pulmonary:      Effort: Pulmonary effort is normal. No respiratory distress.      Breath sounds: Normal breath sounds. No wheezing or rhonchi.   Abdominal:      General: Abdomen is flat.      Palpations: Abdomen is soft.   Musculoskeletal:      Cervical back: Normal range of motion and neck supple.      Right lower leg: No edema.      Left lower leg: No edema.   Skin:     General: Skin is warm and dry.      Coloration: Skin is not jaundiced.   Neurological:      General: No focal deficit present.      Mental Status: She is alert and oriented to person, place, and time. Mental status is at baseline.      GCS: GCS eye subscore is 4. GCS verbal subscore is 5. GCS motor subscore is 6.      Cranial Nerves: No cranial nerve deficit.      Motor: No weakness.      Gait: Gait normal.   Psychiatric:         Mood and Affect: Mood and affect normal. Mood is not anxious.         Speech: Speech normal.         Behavior: Behavior normal.                     Assessment and Plan     Diagnoses and all orders for this visit:    1. Hypertension, " essential (Primary)  Comments:  Will continue medication adjustment.  Amlodipine may be good for pulmonary hypertension diagnosis.  Orders:  -     amLODIPine (NORVASC) 5 MG tablet; Take 1 tablet by mouth Daily.  Dispense: 30 tablet; Refill: 11    2. Palpitations  Comments:  Improved.              Follow Up  Return in about 4 weeks (around 5/13/2024) for Recheck blood pressure, Patient OK with NP visit.    Rosa Mars MD   04/15/2024     Please note that this explicitly excludes time spent on other separate billable services such as performing procedures or test interpretation, when applicable.    This note was created using dictation software which occasionally transcribes nonsensical phrases. Please contact the provider if any clarification is needed.

## 2024-05-20 ENCOUNTER — OFFICE VISIT (OUTPATIENT)
Dept: CARDIOLOGY | Facility: CLINIC | Age: 49
End: 2024-05-20
Payer: MEDICARE

## 2024-05-20 VITALS
HEIGHT: 66 IN | OXYGEN SATURATION: 98 % | HEART RATE: 92 BPM | SYSTOLIC BLOOD PRESSURE: 136 MMHG | DIASTOLIC BLOOD PRESSURE: 80 MMHG | WEIGHT: 234 LBS | BODY MASS INDEX: 37.61 KG/M2

## 2024-05-20 DIAGNOSIS — I10 HYPERTENSION, ESSENTIAL: Primary | ICD-10-CM

## 2024-05-20 PROCEDURE — 1159F MED LIST DOCD IN RCRD: CPT | Performed by: NURSE PRACTITIONER

## 2024-05-20 PROCEDURE — 3075F SYST BP GE 130 - 139MM HG: CPT | Performed by: NURSE PRACTITIONER

## 2024-05-20 PROCEDURE — 3079F DIAST BP 80-89 MM HG: CPT | Performed by: NURSE PRACTITIONER

## 2024-05-20 PROCEDURE — 99213 OFFICE O/P EST LOW 20 MIN: CPT | Performed by: NURSE PRACTITIONER

## 2024-05-20 PROCEDURE — 1160F RVW MEDS BY RX/DR IN RCRD: CPT | Performed by: NURSE PRACTITIONER

## 2024-05-20 NOTE — ASSESSMENT & PLAN NOTE
Hypertension is improving.  Continue current treatment regimen.  Dietary sodium restriction.  Weight loss.  Regular aerobic exercise.  Ambulatory blood pressure monitoring.  Blood pressure will be reassessed in 6 months.    Amlodipine was initiated at last office visit and patient is tolerating well without side effects.  BP today is 136/80.  -Continue monitoring blood pressure closely at home  - Follow-up here in 6 months or sooner if BP worsens.

## 2024-05-20 NOTE — PROGRESS NOTES
[facility] Provider Note     Date:   2024   Name: Kiara Eason  :   1975  PCP: Lynette Alvarado APRN    Chief Complaint   Patient presents with    Hypertension    Sleep Apnea       Subjective     History of Present Illness  Kiara Eason is a 48 y.o. female who presents today for follow-up on hypertension.  Patient was evaluated by Dr. Mars approximately 1 month ago.  Her blood pressure was elevated at that time.  Amlodipine 5 mg once daily was initiated.  She reports that she is doing well on the medication with no side effects.  She has not been checking her blood pressure at home.  BP today is 136/80.  She denies any new symptoms since her last visit.  She denies chest pain, shortness of breath, palpitations or syncope.  She has chronic lower extremity edema that is currently stable.  She has a history of SOTERO and narcolepsy and is followed by  sleep medicine.    Cardiac/Sleep History  1. SOTERO- followed by  sleep clinic  2. Hypothyroid  3. COPD  4. Mild pulmonary hypertension  5. Narcolepsy/insomnia- sleep    - Unable to tolerate Potassium    Echocardiogram 23- LVEF 61-65%. Mild pulmonary hypertension     Holter monitor 3/21/23-  ·  A relatively benign monitor study.  ·  No significant arrhythmias, nonsustained SVT noted.        Allergies   Allergen Reactions    Benadryl [Diphenhydramine Hcl (Sleep)] Anaphylaxis     But does ok with loratadine     Diphenhydramine Anaphylaxis       Current Outpatient Medications:     albuterol sulfate  (90 Base) MCG/ACT inhaler, Inhale 2 puffs., Disp: , Rfl:     amLODIPine (NORVASC) 5 MG tablet, Take 1 tablet by mouth Daily., Disp: 30 tablet, Rfl: 11    ARIPiprazole (ABILIFY) 15 MG tablet, Take 1 oral tablet once a day, Disp: , Rfl:     atorvastatin (LIPITOR) 80 MG tablet, Take 1 tablet by mouth every night at bedtime., Disp: , Rfl:     budesonide-formoterol (SYMBICORT) 80-4.5 MCG/ACT inhaler, Inhale 2 puffs  2 (Two) Times a Day., Disp: , Rfl:     Jardiance 25 MG tablet tablet, Take 1 tablet by mouth Daily., Disp: , Rfl:     levothyroxine (SYNTHROID, LEVOTHROID) 150 MCG tablet, Take 1 tablet by mouth Daily., Disp: , Rfl:     losartan (COZAAR) 100 MG tablet, Take 1 tablet by mouth Daily., Disp: , Rfl:     mirtazapine (REMERON) 15 MG tablet, , Disp: , Rfl:     montelukast (SINGULAIR) 10 MG tablet, Take 1 tablet by mouth Daily., Disp: , Rfl:     Nicotine Step 1 21 MG/24HR patch, Apply 1 patch every day by transdermal route for 28 days., Disp: , Rfl:     omeprazole (priLOSEC) 20 MG capsule, TAKE 1 CAPSULE BY MOUTH EVERY DAY, Disp: 90 capsule, Rfl: 2    prazosin (MINIPRESS) 5 MG capsule, 1 capsule Daily., Disp: , Rfl:     venlafaxine XR (EFFEXOR-XR) 150 MG 24 hr capsule, , Disp: , Rfl:     vitamin D (ERGOCALCIFEROL) 1.25 MG (08833 UT) capsule capsule, Take 1 capsule by mouth 1 (One) Time Per Week., Disp: , Rfl:     Wakix 17.8 MG tablet, , Disp: , Rfl:     Past Medical History:   Diagnosis Date    Acid reflux     Allergic     Anxiety     Chronic pain disorder     Depression     Disease of thyroid gland     Gallbladder abscess     Hypertension     Migraines     Sleep apnea       Past Surgical History:   Procedure Laterality Date    BLADDER REPAIR      sling    CARPAL TUNNEL RELEASE Bilateral     CERVICAL CONIZATION      CHOLECYSTECTOMY      THYROID SURGERY      RADIATION     TONSILLECTOMY      TUBAL ABDOMINAL LIGATION       Family History   Problem Relation Age of Onset    Thyroid disease Mother     Myelodysplastic syndrome Mother     Mental illness Mother     Hepatitis Mother         C    Mental illness Father     Pneumonia Father     Alcohol abuse Father     Cancer Cousin     Breast cancer Cousin         MATERNAL 40'S-50'S    Ovarian cancer Other         MAT GREAT AUNT 50'S UTERINE/CERVICAL/OVARIAN     Lung cancer Other      Social History     Socioeconomic History    Marital status:     Number of children: 2  "  Tobacco Use    Smoking status: Every Day     Current packs/day: 1.00     Average packs/day: 1 pack/day for 20.0 years (20.0 ttl pk-yrs)     Types: Cigarettes     Passive exposure: Never    Smokeless tobacco: Never   Vaping Use    Vaping status: Never Used   Substance and Sexual Activity    Alcohol use: Not Currently    Drug use: Yes     Types: Marijuana     Comment: 3-4 times weekly    Sexual activity: Yes     Partners: Male     Birth control/protection: Surgical       Objective     Vital Signs:  /80   Pulse 92   Ht 167.6 cm (66\")   Wt 106 kg (234 lb)   SpO2 98%   BMI 37.77 kg/m²   Estimated body mass index is 37.77 kg/m² as calculated from the following:    Height as of this encounter: 167.6 cm (66\").    Weight as of this encounter: 106 kg (234 lb).             Physical Exam  Vitals reviewed.   Constitutional:       Appearance: Normal appearance.   HENT:      Head: Normocephalic.   Cardiovascular:      Rate and Rhythm: Normal rate and regular rhythm.      Heart sounds: Normal heart sounds.   Pulmonary:      Effort: Pulmonary effort is normal.      Breath sounds: Normal breath sounds.   Musculoskeletal:      Right lower leg: No edema.      Left lower leg: No edema.   Skin:     General: Skin is warm and dry.      Capillary Refill: Capillary refill takes less than 2 seconds.   Neurological:      General: No focal deficit present.      Mental Status: She is alert and oriented to person, place, and time.   Psychiatric:         Mood and Affect: Mood normal.         Behavior: Behavior normal.                     Assessment and Plan     Diagnoses and all orders for this visit:    1. Hypertension, essential (Primary)  Assessment & Plan:  Hypertension is improving.  Continue current treatment regimen.  Dietary sodium restriction.  Weight loss.  Regular aerobic exercise.  Ambulatory blood pressure monitoring.  Blood pressure will be reassessed in 6 months.    Amlodipine was initiated at last office visit and " patient is tolerating well without side effects.  BP today is 136/80.  -Continue monitoring blood pressure closely at home  - Follow-up here in 6 months or sooner if BP worsens.          Recommendations: Report if any new/changing symptoms immediately and Limit salt          Follow Up  Return in about 6 months (around 11/20/2024).  Patient was given instructions and counseling regarding her condition or for health maintenance advice. Please see specific information pulled into the AVS if appropriate.

## 2024-06-17 DIAGNOSIS — K21.9 GASTROESOPHAGEAL REFLUX DISEASE WITHOUT ESOPHAGITIS: ICD-10-CM

## 2024-06-17 RX ORDER — OMEPRAZOLE 20 MG/1
20 CAPSULE, DELAYED RELEASE ORAL DAILY
Qty: 180 CAPSULE | Refills: 0 | Status: SHIPPED | OUTPATIENT
Start: 2024-06-17

## 2024-11-26 ENCOUNTER — OFFICE VISIT (OUTPATIENT)
Dept: CARDIOLOGY | Facility: CLINIC | Age: 49
End: 2024-11-26
Payer: MEDICARE

## 2024-11-26 VITALS
OXYGEN SATURATION: 96 % | HEIGHT: 65 IN | BODY MASS INDEX: 36.82 KG/M2 | DIASTOLIC BLOOD PRESSURE: 76 MMHG | HEART RATE: 80 BPM | TEMPERATURE: 97.7 F | SYSTOLIC BLOOD PRESSURE: 126 MMHG | RESPIRATION RATE: 16 BRPM | WEIGHT: 221 LBS

## 2024-11-26 DIAGNOSIS — Z00.00 LABORATORY EXAM ORDERED AS PART OF ROUTINE GENERAL MEDICAL EXAMINATION: ICD-10-CM

## 2024-11-26 DIAGNOSIS — I10 HYPERTENSION, ESSENTIAL: Primary | ICD-10-CM

## 2024-11-26 DIAGNOSIS — G47.33 OSA (OBSTRUCTIVE SLEEP APNEA): ICD-10-CM

## 2024-11-26 RX ORDER — ARIPIPRAZOLE 10 MG/1
TABLET ORAL
COMMUNITY
Start: 2024-11-22

## 2024-11-26 RX ORDER — LEVOTHYROXINE SODIUM 137 UG/1
TABLET ORAL
COMMUNITY
Start: 2024-11-22

## 2024-11-26 RX ORDER — VENLAFAXINE HYDROCHLORIDE 75 MG/1
CAPSULE, EXTENDED RELEASE ORAL
COMMUNITY
Start: 2024-10-28

## 2024-11-26 NOTE — PROGRESS NOTES
[facility] Provider Note     Date:   2024   Name: Kiara Eason  :   1975  PCP: Provider, No Known    Chief Complaint   Patient presents with    Hypertension     6 month follow up       Subjective     History of Present Illness  Kiara Eason is a 48 y.o. female who presents today for 6-month follow up on hypertension. Her blood pressure has been stable since the addition of Amlodipine.  Her blood pressure is 126/76 today.  She denies any new symptoms at this time.  She denies chest pain, palpitations, dizziness or syncope.  She has chronic shortness of breath and lower extremity edema that is currently stable. She has a history of SOTERO and narcolepsy and is followed by  sleep medicine.  She is overdue for annual labs.    Cardiac/Sleep History  1. SOTERO- followed by  sleep clinic  2. Hypothyroid  3. COPD  4. Mild pulmonary hypertension  5. Narcolepsy/insomnia- sleep    - Unable to tolerate Potassium    Echocardiogram 23- LVEF 61-65%. Mild pulmonary hypertension     Holter monitor 3/21/23-  ·  A relatively benign monitor study.  ·  No significant arrhythmias, nonsustained SVT noted.     Reports Denies   Chest Pain [] [x]   Shortness of Air [x] []   Palpitations [] [x]   Edema [x] []   Dizziness [] [x]   Syncope [] [x]       Allergies   Allergen Reactions    Benadryl [Diphenhydramine Hcl (Sleep)] Anaphylaxis     But does ok with loratadine     Diphenhydramine Anaphylaxis       Current Outpatient Medications:     albuterol sulfate  (90 Base) MCG/ACT inhaler, Inhale 2 puffs., Disp: , Rfl:     amLODIPine (NORVASC) 5 MG tablet, Take 1 tablet by mouth Daily., Disp: 30 tablet, Rfl: 11    ARIPiprazole (ABILIFY) 10 MG tablet, , Disp: , Rfl:     atorvastatin (LIPITOR) 80 MG tablet, Take 1 tablet by mouth every night at bedtime., Disp: , Rfl:     budesonide-formoterol (SYMBICORT) 80-4.5 MCG/ACT inhaler, Inhale 2 puffs 2 (Two) Times a Day., Disp: , Rfl:      Jardiance 25 MG tablet tablet, Take 1 tablet by mouth Daily., Disp: , Rfl:     levothyroxine (SYNTHROID, LEVOTHROID) 137 MCG tablet, , Disp: , Rfl:     losartan (COZAAR) 100 MG tablet, Take 1 tablet by mouth Daily., Disp: , Rfl:     mirtazapine (REMERON) 15 MG tablet, , Disp: , Rfl:     montelukast (SINGULAIR) 10 MG tablet, Take 1 tablet by mouth Daily., Disp: , Rfl:     Nicotine Step 1 21 MG/24HR patch, Apply 1 patch every day by transdermal route for 28 days., Disp: , Rfl:     omeprazole (priLOSEC) 20 MG capsule, TAKE ONE CAPSULE BY MOUTH EVERY DAY, Disp: 180 capsule, Rfl: 0    prazosin (MINIPRESS) 5 MG capsule, 1 capsule Daily., Disp: , Rfl:     venlafaxine XR (EFFEXOR-XR) 75 MG 24 hr capsule, Take 1 oral capsule once a day, Disp: , Rfl:     vitamin D (ERGOCALCIFEROL) 1.25 MG (31672 UT) capsule capsule, Take 1 capsule by mouth 1 (One) Time Per Week., Disp: , Rfl:     Wakix 17.8 MG tablet, , Disp: , Rfl:     Past Medical History:   Diagnosis Date    Acid reflux     Allergic     Anxiety     Chronic pain disorder     Depression     Disease of thyroid gland     Gallbladder abscess     Hypertension     Migraines     Sleep apnea       Past Surgical History:   Procedure Laterality Date    BLADDER REPAIR      sling    CARPAL TUNNEL RELEASE Bilateral     CERVICAL CONIZATION      CHOLECYSTECTOMY      HYSTERECTOMY  2019    THYROID SURGERY      RADIATION     TONSILLECTOMY      TUBAL ABDOMINAL LIGATION       Family History   Problem Relation Age of Onset    Thyroid disease Mother     Myelodysplastic syndrome Mother     Mental illness Mother     Hepatitis Mother         C    Mental illness Father     Pneumonia Father     Alcohol abuse Father     Cancer Cousin     Breast cancer Cousin         MATERNAL 40'S-50'S    Ovarian cancer Other         MAT GREAT AUNT 50'S UTERINE/CERVICAL/OVARIAN     Lung cancer Other      Social History     Socioeconomic History    Marital status:     Number of children: 2   Tobacco Use     "Smoking status: Every Day     Current packs/day: 1.00     Average packs/day: 1 pack/day for 20.0 years (20.0 ttl pk-yrs)     Types: Cigarettes     Passive exposure: Never    Smokeless tobacco: Never   Vaping Use    Vaping status: Never Used   Substance and Sexual Activity    Alcohol use: Not Currently    Drug use: Yes     Types: Marijuana     Comment: 3-4 times weekly    Sexual activity: Yes     Partners: Male     Birth control/protection: Surgical       Objective     Vital Signs:  /76 (BP Location: Right arm, Patient Position: Sitting, Cuff Size: Large Adult)   Pulse 80   Temp 97.7 °F (36.5 °C) (Infrared)   Resp 16   Ht 165.1 cm (65\")   Wt 100 kg (221 lb)   SpO2 96% Comment: RA  BMI 36.78 kg/m²   Estimated body mass index is 36.78 kg/m² as calculated from the following:    Height as of this encounter: 165.1 cm (65\").    Weight as of this encounter: 100 kg (221 lb).             Physical Exam  Vitals reviewed.   Constitutional:       Appearance: Normal appearance.   HENT:      Head: Normocephalic.   Cardiovascular:      Rate and Rhythm: Normal rate and regular rhythm.      Heart sounds: Normal heart sounds.   Pulmonary:      Effort: Pulmonary effort is normal.      Breath sounds: Normal breath sounds.   Musculoskeletal:      Right lower leg: Edema (trace) present.      Left lower leg: Edema (trace) present.   Skin:     General: Skin is warm and dry.      Capillary Refill: Capillary refill takes less than 2 seconds.   Neurological:      General: No focal deficit present.      Mental Status: She is alert and oriented to person, place, and time.   Psychiatric:         Mood and Affect: Mood normal.         Behavior: Behavior normal.                 ECG 12 Lead    Date/Time: 11/26/2024 4:15 PM  Performed by: Sarah Camacho APRN    Authorized by: Sarah Camacho APRN  Comparison: not compared with previous ECG   Previous ECG: no previous ECG available  Rhythm: sinus rhythm  Rate: normal  BPM: " 80  QRS axis: normal    Clinical impression: normal ECG           Assessment and Plan     Diagnoses and all orders for this visit:    1. Hypertension, essential (Primary)  Assessment & Plan:  Hypertension is stable and controlled  Continue current treatment regimen.  Weight loss.  Regular aerobic exercise.  Blood pressure will be reassessed in 6 months.    Blood pressure has been stable since starting amlodipine.  No current side effects.    - Continue current medical therapy    Orders:  -     ECG 12 Lead; Future  -     ECG 12 Lead    2. SOTERO (obstructive sleep apnea)  Assessment & Plan:  History of SOTERO and narcolepsy-managed by  sleep clinic.    -Continue management with       3. Laboratory exam ordered as part of routine general medical examination  Assessment & Plan:  Overdue for annual labs.    Orders:  -     CBC & Differential; Future  -     Comprehensive Metabolic Panel; Future  -     Lipid Panel; Future  -     TSH Rfx On Abnormal To Free T4; Future        Recommendations: ER if symptoms increase and Report if any new/changing symptoms immediately          Follow Up  Return in about 6 months (around 5/26/2025).  Patient was given instructions and counseling regarding her condition or for health maintenance advice. Please see specific information pulled into the AVS if appropriate.

## 2024-11-26 NOTE — ASSESSMENT & PLAN NOTE
Hypertension is stable and controlled  Continue current treatment regimen.  Weight loss.  Regular aerobic exercise.  Blood pressure will be reassessed in 6 months.    Blood pressure has been stable since starting amlodipine.  No current side effects.    - Continue current medical therapy

## 2024-12-12 DIAGNOSIS — K21.9 GASTROESOPHAGEAL REFLUX DISEASE WITHOUT ESOPHAGITIS: ICD-10-CM

## 2025-03-28 DIAGNOSIS — Z00.00 LABORATORY EXAM ORDERED AS PART OF ROUTINE GENERAL MEDICAL EXAMINATION: ICD-10-CM

## 2025-04-09 ENCOUNTER — TELEPHONE (OUTPATIENT)
Dept: CARDIOLOGY | Facility: CLINIC | Age: 50
End: 2025-04-09
Payer: MEDICARE

## 2025-04-09 NOTE — TELEPHONE ENCOUNTER
[facility] PT NEEDS 5/23/25 APPT RESCHEDULED. PROVIDER WILL BE OUT OF OFFICE. HUB OK TO RELAY AND RESCHEDULE. THANKS

## 2025-04-16 DIAGNOSIS — I10 HYPERTENSION, ESSENTIAL: ICD-10-CM

## 2025-04-16 RX ORDER — AMLODIPINE BESYLATE 5 MG/1
5 TABLET ORAL DAILY
Qty: 30 TABLET | Refills: 5 | Status: SHIPPED | OUTPATIENT
Start: 2025-04-16

## 2025-06-12 ENCOUNTER — OFFICE VISIT (OUTPATIENT)
Dept: CARDIOLOGY | Facility: CLINIC | Age: 50
End: 2025-06-12
Payer: MEDICARE

## 2025-06-12 VITALS
HEIGHT: 65 IN | SYSTOLIC BLOOD PRESSURE: 122 MMHG | BODY MASS INDEX: 35.5 KG/M2 | HEART RATE: 93 BPM | WEIGHT: 213.1 LBS | DIASTOLIC BLOOD PRESSURE: 76 MMHG | OXYGEN SATURATION: 98 %

## 2025-06-12 DIAGNOSIS — G47.33 OSA (OBSTRUCTIVE SLEEP APNEA): ICD-10-CM

## 2025-06-12 DIAGNOSIS — R60.0 BILATERAL LOWER EXTREMITY EDEMA: ICD-10-CM

## 2025-06-12 DIAGNOSIS — I10 HYPERTENSION, ESSENTIAL: Primary | Chronic | ICD-10-CM

## 2025-06-12 DIAGNOSIS — M79.89 LEG SWELLING: ICD-10-CM

## 2025-06-12 PROCEDURE — 3074F SYST BP LT 130 MM HG: CPT | Performed by: NURSE PRACTITIONER

## 2025-06-12 PROCEDURE — 99213 OFFICE O/P EST LOW 20 MIN: CPT | Performed by: NURSE PRACTITIONER

## 2025-06-12 PROCEDURE — 3078F DIAST BP <80 MM HG: CPT | Performed by: NURSE PRACTITIONER

## 2025-06-12 RX ORDER — ERGOCALCIFEROL 1.25 MG/1
50000 CAPSULE, LIQUID FILLED ORAL WEEKLY
Qty: 5 CAPSULE | Refills: 11 | Status: SHIPPED | OUTPATIENT
Start: 2025-06-12

## 2025-06-12 RX ORDER — SPIRONOLACTONE 25 MG/1
25 TABLET ORAL DAILY
Qty: 30 TABLET | Refills: 11 | Status: SHIPPED | OUTPATIENT
Start: 2025-06-12

## 2025-06-12 RX ORDER — HYDROCHLOROTHIAZIDE 12.5 MG/1
12.5 TABLET ORAL DAILY
COMMUNITY
Start: 2025-05-09

## 2025-06-12 NOTE — ASSESSMENT & PLAN NOTE
Patient reports she continues to have lower extremity edema.  She has had issues with hypokalemia and is only on hydrochlorothiazide 12.5 mg daily.  We will plan to start spironolactone 25 mg daily.  We have also discussed side effects of amlodipine and that could be a cause of some lower extremity edema.  Instructed patient that she could try taking a half a tablet of the amlodipine instead of the whole 5 mg to see if this helps with some of the swelling.    Follow-up in 4 weeks or sooner for any change in symptoms.

## 2025-06-12 NOTE — ASSESSMENT & PLAN NOTE
Hypertension is stable and controlled  Continue current treatment regimen.  Blood pressure will be reassessed four her weeks.    Patient to monitor blood pressures at home.  Low-sodium diet.  Weight loss.  Increase aerobic exercise as tolerated.

## 2025-06-12 NOTE — ASSESSMENT & PLAN NOTE
History of SOTERO and narcolepsy that is managed by [facility].  Patient reports that she has quit using her PAP device and stopped taking her narcolepsy medication.  She states that her cat keeps chewing on the tubing of the PAP device and it has become inconvenient.  She states since stopping the usage of her PAP device she can tell a big difference in her sleep and is now experiencing more daytime sleepiness and fatigue.  Patient has been encouraged to schedule a follow-up with  sleep and to restart her PAP device.  Sleep risk such as sleep death, blood clots, hypertension, atrial fibrillation, heart attack, and stroke have been discussed with patient.

## 2025-06-12 NOTE — PROGRESS NOTES
[facility] Provider Note     Date:   2025  Name: Kiara Eason  :   1975  PCP: Lebron Brownlee MD    Chief Complaint   Patient presents with    Hypertension       Subjective     History of Present Illness  Kiara Eason is a 49 y.o. female who presents today for follow up hypertension.    Patient states she has not been using her cpap for about a month.  She states she has also stopped her medication for her narcolepsy.  She has not followed up with the doctor at  for her sleep apnea.  She states that her cat kept chewing on her tubing and it became inconvenient.  Patient has been encouraged to restart her pap machine.  Sleep risk such as sleep death, blood clots, stroke, and heart attack have been discussed.  Patient states she can tell a big difference in her sleep since stopping her usage.      Patient states that her blood pressure has been normal.  She states she has quit monitoring her blood pressure at home.  She states she has not been able to get the lower extremity swelling under control.  She has had issues with hypokalemia and was taken off lasix and only on HCTZ 12.5 mg.  We have discussed low sodium diet and processed foods having higher sodium content.  We discussed starting some spironolactone 25 mg daily.  She is to continue to monitor her blood pressure at home.  Have also discussed with patient the side effects of amlodipine that it may cause lower extremity edema.  She can try taking half of the amlodipine instead of the 5 mg.  Patient denies any exertional chest pain.  She does report stable shortness of breath.  She states she continues to smoke and wants to try to quit smoking by December.  She denies any palpitations, dizziness, syncope.    Cardiac/Sleep History  1. SOTERO- followed by  sleep clinic  2. Hypothyroid  3. COPD  4. Mild pulmonary hypertension  5. Narcolepsy/insomnia- sleep    - Unable to tolerate  Potassium    Echocardiogram 4/4/23- LVEF 61-65%. Mild pulmonary hypertension     Holter monitor 3/21/23-  ·  A relatively benign monitor study.  ·  No significant arrhythmias, nonsustained SVT noted.     Reports Denies   Chest Pain [] [x]   Shortness of Air [x] []   Palpitations [] [x]   Edema [x] []   Dizziness [] [x]   Syncope [] [x]         Allergies   Allergen Reactions    Benadryl [Diphenhydramine Hcl (Sleep)] Anaphylaxis     But does ok with loratadine     Diphenhydramine Anaphylaxis       Current Outpatient Medications:     albuterol sulfate  (90 Base) MCG/ACT inhaler, Inhale 2 puffs., Disp: , Rfl:     amLODIPine (NORVASC) 5 MG tablet, Take 1 tablet by mouth Daily., Disp: 30 tablet, Rfl: 5    ARIPiprazole (ABILIFY) 10 MG tablet, , Disp: , Rfl:     atorvastatin (LIPITOR) 80 MG tablet, Take 1 tablet by mouth every night at bedtime., Disp: , Rfl:     budesonide-formoterol (SYMBICORT) 80-4.5 MCG/ACT inhaler, Inhale 2 puffs 2 (Two) Times a Day., Disp: , Rfl:     hydroCHLOROthiazide 12.5 MG tablet, Take 1 tablet by mouth Daily., Disp: , Rfl:     levothyroxine (SYNTHROID, LEVOTHROID) 137 MCG tablet, , Disp: , Rfl:     losartan (COZAAR) 100 MG tablet, Take 1 tablet by mouth Daily., Disp: , Rfl:     mirtazapine (REMERON) 15 MG tablet, , Disp: , Rfl:     montelukast (SINGULAIR) 10 MG tablet, Take 1 tablet by mouth Daily., Disp: , Rfl:     Nicotine Step 1 21 MG/24HR patch, Apply 1 patch every day by transdermal route for 28 days., Disp: , Rfl:     omeprazole (priLOSEC) 20 MG capsule, TAKE ONE CAPSULE BY MOUTH EVERY DAY, Disp: 180 capsule, Rfl: 0    prazosin (MINIPRESS) 5 MG capsule, 1 capsule Daily., Disp: , Rfl:     venlafaxine XR (EFFEXOR-XR) 75 MG 24 hr capsule, Take 1 oral capsule once a day, Disp: , Rfl:     vitamin D (ERGOCALCIFEROL) 1.25 MG (95695 UT) capsule capsule, Take 1 capsule by mouth 1 (One) Time Per Week., Disp: 5 capsule, Rfl: 11    Wakix 17.8 MG tablet, , Disp: , Rfl:     spironolactone  "(ALDACTONE) 25 MG tablet, Take 1 tablet by mouth Daily., Disp: 30 tablet, Rfl: 11    Past Medical History:   Diagnosis Date    Acid reflux     Allergic     Anxiety     CHF (congestive heart failure)     Chronic pain disorder     COPD (chronic obstructive pulmonary disease)     Depression     Disease of thyroid gland     Gallbladder abscess     Hypertension     Migraines     Sleep apnea       Past Surgical History:   Procedure Laterality Date    BLADDER REPAIR      sling    CARPAL TUNNEL RELEASE Bilateral     CERVICAL CONIZATION      CHOLECYSTECTOMY      FINGER SURGERY  07/2023    HYSTERECTOMY  2019    THYROID SURGERY      RADIATION     TONSILLECTOMY      TUBAL ABDOMINAL LIGATION       Family History   Problem Relation Age of Onset    Thyroid disease Mother     Myelodysplastic syndrome Mother     Mental illness Mother     Hepatitis Mother         C    Mental illness Father     Pneumonia Father     Alcohol abuse Father     Cancer Cousin     Breast cancer Cousin         MATERNAL 40'S-50'S    Ovarian cancer Other         MAT GREAT AUNT 50'S UTERINE/CERVICAL/OVARIAN     Lung cancer Other      Social History     Socioeconomic History    Marital status:     Number of children: 2   Tobacco Use    Smoking status: Every Day     Current packs/day: 1.00     Average packs/day: 1 pack/day for 20.0 years (20.0 ttl pk-yrs)     Types: Cigarettes     Passive exposure: Never    Smokeless tobacco: Never   Vaping Use    Vaping status: Never Used   Substance and Sexual Activity    Alcohol use: Not Currently    Drug use: Yes     Types: Marijuana     Comment: 3-4 times weekly    Sexual activity: Yes     Partners: Male     Birth control/protection: Surgical       Objective     Vital Signs:  /76 (BP Location: Right arm, Patient Position: Sitting, Cuff Size: Large Adult)   Pulse 93   Ht 165.1 cm (65\")   Wt 96.7 kg (213 lb 1.6 oz)   SpO2 98%   BMI 35.46 kg/m²   Estimated body mass index is 35.46 kg/m² as calculated from the " "following:    Height as of this encounter: 165.1 cm (65\").    Weight as of this encounter: 96.7 kg (213 lb 1.6 oz).             Physical Exam  Constitutional:       Appearance: Normal appearance. She is obese.   Cardiovascular:      Rate and Rhythm: Normal rate and regular rhythm.      Pulses: Normal pulses.      Heart sounds: Normal heart sounds.   Pulmonary:      Effort: Pulmonary effort is normal.      Breath sounds: Normal breath sounds.   Musculoskeletal:      Right lower le+ Pitting Edema present.      Left lower le+ Pitting Edema present.   Skin:     General: Skin is warm and dry.      Capillary Refill: Capillary refill takes less than 2 seconds.   Neurological:      General: No focal deficit present.      Mental Status: She is alert and oriented to person, place, and time.   Psychiatric:         Mood and Affect: Mood normal.         Behavior: Behavior normal.         Thought Content: Thought content normal.         Judgment: Judgment normal.         The following data was reviewed by: DANIELLE Aguilar on 2025:    Labs 3/27/2025 have been reviewed.          Assessment and Plan     Diagnoses and all orders for this visit:    1. Hypertension, essential (Primary)  Assessment & Plan:  Hypertension is stable and controlled  Continue current treatment regimen.  Blood pressure will be reassessed four her weeks.    Patient to monitor blood pressures at home.  Low-sodium diet.  Weight loss.  Increase aerobic exercise as tolerated.      2. SOTERO (obstructive sleep apnea)  Assessment & Plan:  History of SOTERO and narcolepsy that is managed by  sleep clinic.  Patient reports that she has quit using her PAP device and stopped taking her narcolepsy medication.  She states that her cat keeps chewing on the tubing of the PAP device and it has become inconvenient.  She states since stopping the usage of her PAP device she can tell a big difference in her sleep and is now experiencing more daytime sleepiness " and fatigue.  Patient has been encouraged to schedule a follow-up with  sleep and to restart her PAP device.  Sleep risk such as sleep death, blood clots, hypertension, atrial fibrillation, heart attack, and stroke have been discussed with patient.      3. Bilateral lower extremity edema  -     spironolactone (ALDACTONE) 25 MG tablet; Take 1 tablet by mouth Daily.  Dispense: 30 tablet; Refill: 11    4. Leg swelling  Assessment & Plan:  Patient reports she continues to have lower extremity edema.  She has had issues with hypokalemia and is only on hydrochlorothiazide 12.5 mg daily.  We will plan to start spironolactone 25 mg daily.  We have also discussed side effects of amlodipine and that could be a cause of some lower extremity edema.  Instructed patient that she could try taking a half a tablet of the amlodipine instead of the whole 5 mg to see if this helps with some of the swelling.    Follow-up in 4 weeks or sooner for any change in symptoms.      Other orders  -     vitamin D (ERGOCALCIFEROL) 1.25 MG (66501 UT) capsule capsule; Take 1 capsule by mouth 1 (One) Time Per Week.  Dispense: 5 capsule; Refill: 11        Recommendations: ER if symptoms increase, Report if any new/changing symptoms immediately, Limit salt, Elevate legs, Compression hose, Stop cigarettes, Sleep risks reviewed (driving, medical, sleep death, sedating agents), Sleep hygiene discussed, and Increase pap therapy usage          Follow Up  Return in about 4 weeks (around 7/10/2025) for Medication changes.  Patient was given instructions and counseling regarding her condition or for health maintenance advice. Please see specific information pulled into the AVS if appropriate.

## 2025-08-14 ENCOUNTER — OFFICE VISIT (OUTPATIENT)
Dept: CARDIOLOGY | Facility: CLINIC | Age: 50
End: 2025-08-14
Payer: MEDICARE

## 2025-08-14 VITALS
DIASTOLIC BLOOD PRESSURE: 82 MMHG | OXYGEN SATURATION: 98 % | HEART RATE: 87 BPM | BODY MASS INDEX: 34.32 KG/M2 | HEIGHT: 65 IN | WEIGHT: 206 LBS | SYSTOLIC BLOOD PRESSURE: 134 MMHG

## 2025-08-14 DIAGNOSIS — I10 HYPERTENSION, ESSENTIAL: Chronic | ICD-10-CM

## 2025-08-14 DIAGNOSIS — I27.20 PULMONARY HYPERTENSION: Chronic | ICD-10-CM

## 2025-08-14 DIAGNOSIS — E87.6 HYPOKALEMIA: Primary | ICD-10-CM

## 2025-08-14 DIAGNOSIS — R60.0 BILATERAL LOWER EXTREMITY EDEMA: ICD-10-CM

## 2025-08-14 DIAGNOSIS — G47.33 OSA (OBSTRUCTIVE SLEEP APNEA): ICD-10-CM

## 2025-08-14 PROBLEM — M79.89 LEG SWELLING: Status: RESOLVED | Noted: 2023-03-21 | Resolved: 2025-08-14

## 2025-08-14 PROCEDURE — 1159F MED LIST DOCD IN RCRD: CPT | Performed by: NURSE PRACTITIONER

## 2025-08-14 PROCEDURE — 1160F RVW MEDS BY RX/DR IN RCRD: CPT | Performed by: NURSE PRACTITIONER

## 2025-08-14 PROCEDURE — 3075F SYST BP GE 130 - 139MM HG: CPT | Performed by: NURSE PRACTITIONER

## 2025-08-14 PROCEDURE — 99214 OFFICE O/P EST MOD 30 MIN: CPT | Performed by: NURSE PRACTITIONER

## 2025-08-14 PROCEDURE — 3079F DIAST BP 80-89 MM HG: CPT | Performed by: NURSE PRACTITIONER

## 2025-08-14 RX ORDER — ARIPIPRAZOLE 5 MG/1
5 TABLET ORAL DAILY
COMMUNITY
Start: 2025-07-18

## 2025-08-27 DIAGNOSIS — E87.6 HYPOKALEMIA: ICD-10-CM

## 2025-08-28 ENCOUNTER — RESULTS FOLLOW-UP (OUTPATIENT)
Dept: CARDIOLOGY | Facility: CLINIC | Age: 50
End: 2025-08-28
Payer: MEDICARE